# Patient Record
Sex: FEMALE | Race: OTHER | Employment: FULL TIME | URBAN - METROPOLITAN AREA
[De-identification: names, ages, dates, MRNs, and addresses within clinical notes are randomized per-mention and may not be internally consistent; named-entity substitution may affect disease eponyms.]

---

## 2017-02-01 ENCOUNTER — OFFICE VISIT (OUTPATIENT)
Dept: OBGYN CLINIC | Facility: CLINIC | Age: 27
End: 2017-02-01

## 2017-02-01 VITALS
HEART RATE: 81 BPM | BODY MASS INDEX: 24.39 KG/M2 | HEIGHT: 65 IN | SYSTOLIC BLOOD PRESSURE: 128 MMHG | WEIGHT: 146.38 LBS | DIASTOLIC BLOOD PRESSURE: 86 MMHG

## 2017-02-01 DIAGNOSIS — Z01.419 WELL WOMAN EXAM WITH ROUTINE GYNECOLOGICAL EXAM: Primary | ICD-10-CM

## 2017-02-01 DIAGNOSIS — Z30.431 IUD CHECK UP: ICD-10-CM

## 2017-02-01 DIAGNOSIS — Z12.4 SCREENING FOR MALIGNANT NEOPLASM OF CERVIX: ICD-10-CM

## 2017-02-01 DIAGNOSIS — Z11.3 SCREENING FOR STD (SEXUALLY TRANSMITTED DISEASE): ICD-10-CM

## 2017-02-01 PROCEDURE — 99385 PREV VISIT NEW AGE 18-39: CPT | Performed by: CLINICAL NURSE SPECIALIST

## 2017-02-01 RX ORDER — AZITHROMYCIN 250 MG/1
TABLET, FILM COATED ORAL
Refills: 0 | COMMUNITY
Start: 2016-11-16 | End: 2017-02-01

## 2017-02-02 LAB
C TRACH DNA SPEC QL NAA+PROBE: NEGATIVE
N GONORRHOEA DNA SPEC QL NAA+PROBE: NEGATIVE

## 2017-02-02 NOTE — PROGRESS NOTES
Tigre Duarte is a 27year old female  No LMP recorded. Patient is not currently having periods (Reason: IUD - Intrauterine Device). Patient presents with:  Gyn Exam: New Patient- Annual  New patient. Unsure of when last annual exam and pap was.  Juana Goddard pain, lumps, or discharge. Neurological:  denies headaches, extremity weakness or numbness. Psychiatric: denies depression or anxiety. Endocrine:   denies excessive thirst or urination. Heme/Lymph:  denies history of anemia, easy bruising or bleeding. with HPV Reflex Request  -     ThinPrep PAP with HPV Reflex Request; Future  -     ThinPrep PAP with HPV Reflex Request        Pap done today-reviewed new pap guidelines. Safe sexual practices and condom use urged.   STD testing done-will call with results

## 2017-02-03 LAB — T VAGINALIS RRNA SPEC QL NAA+PROBE: NEGATIVE

## 2017-03-03 ENCOUNTER — TELEPHONE (OUTPATIENT)
Dept: OBGYN CLINIC | Facility: CLINIC | Age: 27
End: 2017-03-03

## 2017-03-03 NOTE — TELEPHONE ENCOUNTER
Lmtcb. Need to find out if pt plans on completing STD labs that were ordered by [facility] on 2/1/17. Labs postponed for another month.

## 2017-11-08 PROBLEM — F33.1 MODERATE EPISODE OF RECURRENT MAJOR DEPRESSIVE DISORDER (HCC): Status: ACTIVE | Noted: 2017-11-08

## 2017-11-08 PROBLEM — F41.1 GAD (GENERALIZED ANXIETY DISORDER): Status: ACTIVE | Noted: 2017-11-08

## 2018-11-07 ENCOUNTER — OFFICE VISIT (OUTPATIENT)
Dept: OBGYN CLINIC | Facility: CLINIC | Age: 28
End: 2018-11-07
Payer: COMMERCIAL

## 2018-11-07 VITALS
SYSTOLIC BLOOD PRESSURE: 122 MMHG | WEIGHT: 152 LBS | HEART RATE: 85 BPM | DIASTOLIC BLOOD PRESSURE: 79 MMHG | BODY MASS INDEX: 25 KG/M2

## 2018-11-07 DIAGNOSIS — Z30.09 ENCOUNTER FOR COUNSELING REGARDING CONTRACEPTION: ICD-10-CM

## 2018-11-07 DIAGNOSIS — Z01.419 WOMEN'S ANNUAL ROUTINE GYNECOLOGICAL EXAMINATION: Primary | ICD-10-CM

## 2018-11-07 DIAGNOSIS — Z12.4 SCREENING FOR CERVICAL CANCER: ICD-10-CM

## 2018-11-07 PROCEDURE — 99395 PREV VISIT EST AGE 18-39: CPT | Performed by: CLINICAL NURSE SPECIALIST

## 2018-11-14 NOTE — PROGRESS NOTES
Vahe Heredia is a 25year old female  No LMP recorded. Patient is not currently having periods (Reason: IUD - Intrauterine Device). Patient presents with:  Gyn Exam: Discuss Mirena IUD removal.  LAst annual exam and pap was last year.  Requests pap Tab, Take 1 tablet (20 mg total) by mouth daily. , Disp: 90 tablet, Rfl: 0  •  cetirizine 10 MG Oral Tab, Take 10 mg by mouth as needed. , Disp: , Rfl:   •  Levonorgestrel (MIRENA, 52 MG,) 20 MCG/24HR Intrauterine IUD, 1 each by Intrauterine route once. , D normal in size, contour, position, mobility, without tenderness  Adnexa: normal without masses or tenderness  Perineum: normal  Anus: no hemorroids     Assessment & Plan:  Hines Lefort was seen today for gyn exam.    Diagnoses and all orders for this visit:    W

## 2018-11-15 ENCOUNTER — OFFICE VISIT (OUTPATIENT)
Dept: OBGYN CLINIC | Facility: CLINIC | Age: 28
End: 2018-11-15
Payer: COMMERCIAL

## 2018-11-15 VITALS — HEART RATE: 108 BPM | SYSTOLIC BLOOD PRESSURE: 120 MMHG | DIASTOLIC BLOOD PRESSURE: 81 MMHG

## 2018-11-15 DIAGNOSIS — Z30.432 ENCOUNTER FOR REMOVAL OF INTRAUTERINE CONTRACEPTIVE DEVICE: Primary | ICD-10-CM

## 2018-11-15 PROCEDURE — 58301 REMOVE INTRAUTERINE DEVICE: CPT | Performed by: CLINICAL NURSE SPECIALIST

## 2018-11-15 RX ORDER — LEVONORGESTREL AND ETHINYL ESTRADIOL 0.1-0.02MG
1 KIT ORAL DAILY
Qty: 28 TABLET | Refills: 11 | Status: SHIPPED | OUTPATIENT
Start: 2018-11-15 | End: 2018-12-13

## 2018-11-15 NOTE — PROCEDURES
IUD Removal     Pregnancy Results: n/a-IUD not  yet  Birth control method(s) used:  Mirena IUD  Consent signed. Procedure discussed with the patient in detail including indication, risks, benefits, alternatives and complications.     Pelvic Exam Fin

## 2019-05-17 ENCOUNTER — TELEPHONE (OUTPATIENT)
Dept: OBGYN CLINIC | Facility: CLINIC | Age: 29
End: 2019-05-17

## 2019-05-17 NOTE — TELEPHONE ENCOUNTER
Pt had hpt. LMP 3-20-19, exact date. Pt agrees to see all the MDs. Pt states that she will start a pnv with dha. Pt will call if any problems or spotting/bleeing. Pt made an appt for OBN appt. Pt aware to arrive 15 minutes early for upt.   Pt states sheila

## 2019-05-25 ENCOUNTER — NURSE ONLY (OUTPATIENT)
Dept: OBGYN CLINIC | Facility: CLINIC | Age: 29
End: 2019-05-25

## 2019-05-25 VITALS — WEIGHT: 159.63 LBS | HEIGHT: 66 IN | BODY MASS INDEX: 25.66 KG/M2

## 2019-05-25 DIAGNOSIS — Z34.91 ENCOUNTER FOR SUPERVISION OF NORMAL PREGNANCY IN FIRST TRIMESTER, UNSPECIFIED GRAVIDITY: Primary | ICD-10-CM

## 2019-05-25 PROCEDURE — 81025 URINE PREGNANCY TEST: CPT | Performed by: OBSTETRICS & GYNECOLOGY

## 2019-05-25 RX ORDER — CHOLECALCIFEROL (VITAMIN D3) 25 MCG
1 TABLET,CHEWABLE ORAL DAILY
COMMUNITY
End: 2020-11-05

## 2019-05-25 NOTE — PROGRESS NOTES
Pt seen for OBN appt today with no complaints. Normal PN labs, HCV and Sickle Cell ordered. Pt advised all labs must be completed and resulted prior to MD appt.  Assisted pt with scheduling NPN appt with DI[facility]OND on 6/22/19 at 8:30am.     Pt and partner travele No    Down Syndrome No    Hemophilia No    [place]'s Chorea No    Mental Retardation/Autism No    Muscular Dystrophy No    Neural tube defects No    Sickle Cell Disease or trait No    Bear-Sachs Disease No    Thalassemia No    Other inherited genetic or

## 2019-07-01 ENCOUNTER — LAB ENCOUNTER (OUTPATIENT)
Dept: LAB | Facility: HOSPITAL | Age: 29
End: 2019-07-01
Attending: OBSTETRICS & GYNECOLOGY
Payer: COMMERCIAL

## 2019-07-01 DIAGNOSIS — Z34.91 ENCOUNTER FOR SUPERVISION OF NORMAL PREGNANCY IN FIRST TRIMESTER, UNSPECIFIED GRAVIDITY: ICD-10-CM

## 2019-07-01 LAB
ANTIBODY SCREEN: NEGATIVE
BASOPHILS # BLD AUTO: 0.02 X10(3) UL (ref 0–0.2)
BASOPHILS NFR BLD AUTO: 0.3 %
DEPRECATED RDW RBC AUTO: 41.9 FL (ref 35.1–46.3)
EOSINOPHIL # BLD AUTO: 0.03 X10(3) UL (ref 0–0.7)
EOSINOPHIL NFR BLD AUTO: 0.4 %
ERYTHROCYTE [DISTWIDTH] IN BLOOD BY AUTOMATED COUNT: 12.3 % (ref 11–15)
HBV SURFACE AG SER-ACNC: <0.1 [IU]/L
HBV SURFACE AG SERPL QL IA: NONREACTIVE
HCT VFR BLD AUTO: 36.3 % (ref 35–48)
HCV AB SERPL QL IA: NONREACTIVE
HGB BLD-MCNC: 12.1 G/DL (ref 12–16)
IMM GRANULOCYTES # BLD AUTO: 0.06 X10(3) UL (ref 0–1)
IMM GRANULOCYTES NFR BLD: 0.9 %
LYMPHOCYTES # BLD AUTO: 1.64 X10(3) UL (ref 1–4)
LYMPHOCYTES NFR BLD AUTO: 23.4 %
MCH RBC QN AUTO: 30.9 PG (ref 26–34)
MCHC RBC AUTO-ENTMCNC: 33.3 G/DL (ref 31–37)
MCV RBC AUTO: 92.8 FL (ref 80–100)
MONOCYTES # BLD AUTO: 0.33 X10(3) UL (ref 0.1–1)
MONOCYTES NFR BLD AUTO: 4.7 %
NEUTROPHILS # BLD AUTO: 4.92 X10 (3) UL (ref 1.5–7.7)
NEUTROPHILS # BLD AUTO: 4.92 X10(3) UL (ref 1.5–7.7)
NEUTROPHILS NFR BLD AUTO: 70.3 %
PLATELET # BLD AUTO: 363 10(3)UL (ref 150–450)
RBC # BLD AUTO: 3.91 X10(6)UL (ref 3.8–5.3)
RH BLOOD TYPE: POSITIVE
RUBV IGG SER QL: POSITIVE
RUBV IGG SER-ACNC: 31.7 IU/ML (ref 10–?)
WBC # BLD AUTO: 7 X10(3) UL (ref 4–11)

## 2019-07-01 PROCEDURE — 85660 RBC SICKLE CELL TEST: CPT

## 2019-07-01 PROCEDURE — 87340 HEPATITIS B SURFACE AG IA: CPT

## 2019-07-01 PROCEDURE — 85025 COMPLETE CBC W/AUTO DIFF WBC: CPT

## 2019-07-01 PROCEDURE — 87086 URINE CULTURE/COLONY COUNT: CPT

## 2019-07-01 PROCEDURE — 86901 BLOOD TYPING SEROLOGIC RH(D): CPT

## 2019-07-01 PROCEDURE — 86780 TREPONEMA PALLIDUM: CPT

## 2019-07-01 PROCEDURE — 86900 BLOOD TYPING SEROLOGIC ABO: CPT

## 2019-07-01 PROCEDURE — 86803 HEPATITIS C AB TEST: CPT

## 2019-07-01 PROCEDURE — 86762 RUBELLA ANTIBODY: CPT

## 2019-07-01 PROCEDURE — 87389 HIV-1 AG W/HIV-1&-2 AB AG IA: CPT

## 2019-07-01 PROCEDURE — 86850 RBC ANTIBODY SCREEN: CPT

## 2019-07-01 PROCEDURE — 36415 COLL VENOUS BLD VENIPUNCTURE: CPT

## 2019-07-03 ENCOUNTER — INITIAL PRENATAL (OUTPATIENT)
Dept: OBGYN CLINIC | Facility: CLINIC | Age: 29
End: 2019-07-03

## 2019-07-03 VITALS
DIASTOLIC BLOOD PRESSURE: 74 MMHG | BODY MASS INDEX: 25 KG/M2 | HEART RATE: 96 BPM | SYSTOLIC BLOOD PRESSURE: 110 MMHG | WEIGHT: 157 LBS

## 2019-07-03 DIAGNOSIS — Z34.91 ENCOUNTER FOR SUPERVISION OF NORMAL PREGNANCY IN FIRST TRIMESTER, UNSPECIFIED GRAVIDITY: Primary | ICD-10-CM

## 2019-07-03 DIAGNOSIS — Z11.3 SCREEN FOR STD (SEXUALLY TRANSMITTED DISEASE): ICD-10-CM

## 2019-07-03 LAB
HGB S BLD QL SOLY: NEGATIVE
MULTISTIX LOT#: NORMAL NUMERIC
PH, URINE: 5 (ref 4.5–8)
SPECIFIC GRAVITY: 1.02 (ref 1–1.03)
T PALLIDUM AB SER QL: NEGATIVE

## 2019-07-03 PROCEDURE — 76815 OB US LIMITED FETUS(S): CPT | Performed by: OBSTETRICS & GYNECOLOGY

## 2019-07-03 PROCEDURE — 81002 URINALYSIS NONAUTO W/O SCOPE: CPT | Performed by: OBSTETRICS & GYNECOLOGY

## 2019-07-03 NOTE — PROGRESS NOTES
29 yo  at 11 weeks 0 days by sure LMP who presents for first visit. Size equal to dates on US dates. History of depression on Lexapro. Needs referral for psychiatry as her insurance changed. Will generate.   Discussed genetic testing and patient

## 2019-07-05 ENCOUNTER — PATIENT MESSAGE (OUTPATIENT)
Dept: OBGYN CLINIC | Facility: CLINIC | Age: 29
End: 2019-07-05

## 2019-07-05 LAB
C TRACH DNA SPEC QL NAA+PROBE: NEGATIVE
N GONORRHOEA DNA SPEC QL NAA+PROBE: NEGATIVE
T VAGINALIS RRNA SPEC QL NAA+PROBE: NEGATIVE

## 2019-07-25 ENCOUNTER — PATIENT MESSAGE (OUTPATIENT)
Dept: OBGYN CLINIC | Facility: CLINIC | Age: 29
End: 2019-07-25

## 2019-07-25 RX ORDER — ESCITALOPRAM OXALATE 20 MG/1
30 TABLET ORAL DAILY
Qty: 45 TABLET | Refills: 2 | Status: SHIPPED | OUTPATIENT
Start: 2019-07-25 | End: 2020-03-18

## 2019-07-25 NOTE — TELEPHONE ENCOUNTER
WILL YOU REFILL LEXAPRO? WILL HAVE TO DIRECT PT TO MANAGED CARE FOR OTHER OPTIONS RELATED TO HER INSURANCE.

## 2019-07-25 NOTE — TELEPHONE ENCOUNTER
From: Jer Ordaz  To: Rodrick Henriquez DO  Sent: 7/25/2019 12:35 PM CDT  Subject: Prescription Question    Marlon Smith cannot see me until October and I only have a week left of medication. Can you refill it until then?      I asked Britton Stephens

## 2019-07-26 NOTE — TELEPHONE ENCOUNTER
From: Jeevan Reyes     Sent: 7/25/2019 12:33 PM CDT       To:  Britton SMITH  Subject: RE: Psychiatry referral information    Alfred Dugan cannot see me until October at the earliest and I need help with my anxiety and depression immediatel

## 2019-07-26 NOTE — TELEPHONE ENCOUNTER
Please call patient and notify of Dr. Jain Late as an alternative.   Otherwise she needs to contact her insurance and see who is covered by her policy

## 2019-07-30 ENCOUNTER — ROUTINE PRENATAL (OUTPATIENT)
Dept: OBGYN CLINIC | Facility: CLINIC | Age: 29
End: 2019-07-30

## 2019-07-30 VITALS
HEART RATE: 80 BPM | DIASTOLIC BLOOD PRESSURE: 65 MMHG | BODY MASS INDEX: 26 KG/M2 | WEIGHT: 162.63 LBS | SYSTOLIC BLOOD PRESSURE: 107 MMHG

## 2019-07-30 DIAGNOSIS — Z34.82 ENCOUNTER FOR SUPERVISION OF OTHER NORMAL PREGNANCY IN SECOND TRIMESTER: Primary | ICD-10-CM

## 2019-07-30 PROBLEM — Q87.40 MARFAN SYNDROME: Status: ACTIVE | Noted: 2019-07-30

## 2019-07-30 PROBLEM — Q87.40 MARFAN SYNDROME (HCC): Status: ACTIVE | Noted: 2019-07-30

## 2019-07-30 LAB
APPEARANCE: CLEAR
MULTISTIX LOT#: NORMAL NUMERIC
PH, URINE: 6.5 (ref 4.5–8)
SPECIFIC GRAVITY: 1.01 (ref 1–1.03)
URINE-COLOR: YELLOW
UROBILINOGEN,SEMI-QN: 0.2 MG/DL (ref 0–1.9)

## 2019-07-30 PROCEDURE — 81002 URINALYSIS NONAUTO W/O SCOPE: CPT | Performed by: OBSTETRICS & GYNECOLOGY

## 2019-07-30 NOTE — PROGRESS NOTES
No complaints. Half brother with Marfans. Pt thinks it runs on his father's side of the family (not related). She is to check with her mother and consider genetics.    RTC 4 wks

## 2019-08-02 ENCOUNTER — PATIENT MESSAGE (OUTPATIENT)
Dept: OBGYN CLINIC | Facility: CLINIC | Age: 29
End: 2019-08-02

## 2019-08-02 DIAGNOSIS — Z82.79 FAMILY HISTORY OF MARFAN SYNDROME: Primary | ICD-10-CM

## 2019-08-02 NOTE — TELEPHONE ENCOUNTER
From: Chris Taylor  To: Lars Mcdonnell MD  Sent: 8/2/2019 1:15 PM CDT  Subject: Other    Good Afternoon,    Just wanted to follow up quickly on the question about Marphans. My half brother was not diagnosed.  He went through genetic testing and they [facility]

## 2019-08-02 NOTE — TELEPHONE ENCOUNTER
From: José Miguel Blair  To: Tyron Cooley DO  Sent: 8/2/2019 1:17 PM CDT  Subject: Referral Request    At our initial appointment we discussed an anatomy scan with the high risk physicians around 20 weeks.  Can you send a referral so that we can get sheila

## 2019-08-27 ENCOUNTER — ROUTINE PRENATAL (OUTPATIENT)
Dept: OBGYN CLINIC | Facility: CLINIC | Age: 29
End: 2019-08-27

## 2019-08-27 VITALS
BODY MASS INDEX: 28 KG/M2 | SYSTOLIC BLOOD PRESSURE: 104 MMHG | HEART RATE: 101 BPM | WEIGHT: 171 LBS | DIASTOLIC BLOOD PRESSURE: 71 MMHG

## 2019-08-27 DIAGNOSIS — Z34.02 ENCOUNTER FOR SUPERVISION OF NORMAL FIRST PREGNANCY IN SECOND TRIMESTER: Primary | ICD-10-CM

## 2019-08-27 PROBLEM — Z78.9 RECENT FOREIGN TRAVEL: Status: ACTIVE | Noted: 2019-08-27

## 2019-08-27 LAB
APPEARANCE: CLEAR
MULTISTIX LOT#: NORMAL NUMERIC
URINE-COLOR: YELLOW

## 2019-08-27 PROCEDURE — 81002 URINALYSIS NONAUTO W/O SCOPE: CPT | Performed by: OBSTETRICS & GYNECOLOGY

## 2019-09-09 NOTE — PROGRESS NOTES
Outpatient Maternal-Fetal Medicine Consultation    Dear Dr. Tilton Rubinstein    Thank you for requesting ultrasound evaluation and maternal fetal medicine consultation on your patient Rukhsana Domínguez.   As you are aware she is a 30year old female  with a singl interpreted the results and reviewed them with the patient.     DISCUSSION  During her visit we discussed and reviewed the following issues:  [place]  History  This patient traveled to an area with ongoing [facility] virus transmission however she den ultrasonographic screening or frequency of ultrasonography in pregnant women with Zika virus infection.   If maternal testing does not suggest infection, women should receive the same ultrasonographic screening as any other pregnant woman as part of standar exposure. · Symptomatic pregnant women with possible Zika virus exposure or women who are pregnant with a fetus showing abnormalities consistent with con- genital Zika virus syndrome should be tested as soon as possible.  Asymptomatic pregnant women with o not diagnosed with anything. It was noted that he has physical attributes of several non-harmful genetic disorders like extra long limbs, pectus excavatum and loose joints.  They did not test his parents, but based on physical examination it was felt that t

## 2019-09-10 ENCOUNTER — HOSPITAL ENCOUNTER (OUTPATIENT)
Dept: PERINATAL CARE | Facility: HOSPITAL | Age: 29
Discharge: HOME | End: 2019-09-10
Attending: OBSTETRICS & GYNECOLOGY
Payer: COMMERCIAL

## 2019-09-10 VITALS
WEIGHT: 171 LBS | SYSTOLIC BLOOD PRESSURE: 116 MMHG | BODY MASS INDEX: 28 KG/M2 | HEART RATE: 90 BPM | DIASTOLIC BLOOD PRESSURE: 69 MMHG

## 2019-09-10 DIAGNOSIS — O99.891 ZIKA VIRUS EXPOSURE AFFECTING PREGNANCY: ICD-10-CM

## 2019-09-10 DIAGNOSIS — Z20.821 ZIKA VIRUS EXPOSURE AFFECTING PREGNANCY: ICD-10-CM

## 2019-09-10 DIAGNOSIS — Q87.40 MARFAN SYNDROME: ICD-10-CM

## 2019-09-10 DIAGNOSIS — Z78.9 RECENT FOREIGN TRAVEL: ICD-10-CM

## 2019-09-10 DIAGNOSIS — Z82.79 FAMILY HISTORY OF MARFAN SYNDROME: ICD-10-CM

## 2019-09-10 DIAGNOSIS — Z36.3 ENCOUNTER FOR ANTENATAL SCREENING FOR MALFORMATION USING ULTRASOUND: ICD-10-CM

## 2019-09-10 DIAGNOSIS — Z36.3 ENCOUNTER FOR ANTENATAL SCREENING FOR MALFORMATION USING ULTRASOUND: Primary | ICD-10-CM

## 2019-09-10 PROCEDURE — 76811 OB US DETAILED SNGL FETUS: CPT | Performed by: OBSTETRICS & GYNECOLOGY

## 2019-09-10 PROCEDURE — 99243 OFF/OP CNSLTJ NEW/EST LOW 30: CPT | Performed by: OBSTETRICS & GYNECOLOGY

## 2019-09-11 ENCOUNTER — TELEPHONE (OUTPATIENT)
Dept: OBGYN CLINIC | Facility: CLINIC | Age: 29
End: 2019-09-11

## 2019-09-11 DIAGNOSIS — Z78.9 RECENT FOREIGN TRAVEL: Primary | ICD-10-CM

## 2019-09-11 NOTE — TELEPHONE ENCOUNTER
----- Message from Cas Grant sent at 9/10/2019  2:49 PM CDT -----  Regarding: [facility] REFERRAL  [facility] scheduled for 12/4/19    Thanks  Francisca Hobson

## 2019-09-25 ENCOUNTER — ROUTINE PRENATAL (OUTPATIENT)
Dept: OBGYN CLINIC | Facility: CLINIC | Age: 29
End: 2019-09-25

## 2019-09-25 VITALS
HEART RATE: 88 BPM | DIASTOLIC BLOOD PRESSURE: 65 MMHG | WEIGHT: 174.38 LBS | SYSTOLIC BLOOD PRESSURE: 98 MMHG | BODY MASS INDEX: 28 KG/M2

## 2019-09-25 DIAGNOSIS — Z34.92 ENCOUNTER FOR SUPERVISION OF NORMAL PREGNANCY IN SECOND TRIMESTER, UNSPECIFIED GRAVIDITY: Primary | ICD-10-CM

## 2019-09-25 LAB
APPEARANCE: CLEAR
MULTISTIX LOT#: NORMAL NUMERIC
SPECIFIC GRAVITY: 1.03 (ref 1–1.03)
URINE-COLOR: YELLOW

## 2019-09-25 PROCEDURE — 81002 URINALYSIS NONAUTO W/O SCOPE: CPT | Performed by: OBSTETRICS & GYNECOLOGY

## 2019-09-30 NOTE — PROGRESS NOTES
Unknown gender. Whitney Denson at visit. Discussed Level 2. She has GERD and we discussed PO H2 blocker.

## 2019-10-23 ENCOUNTER — TELEPHONE (OUTPATIENT)
Dept: OBGYN CLINIC | Facility: CLINIC | Age: 29
End: 2019-10-23

## 2019-10-23 ENCOUNTER — APPOINTMENT (OUTPATIENT)
Dept: LAB | Facility: HOSPITAL | Age: 29
End: 2019-10-23
Attending: OBSTETRICS & GYNECOLOGY
Payer: COMMERCIAL

## 2019-10-23 DIAGNOSIS — Z34.82 ENCOUNTER FOR SUPERVISION OF OTHER NORMAL PREGNANCY IN SECOND TRIMESTER: Primary | ICD-10-CM

## 2019-10-23 DIAGNOSIS — Z34.82 ENCOUNTER FOR SUPERVISION OF OTHER NORMAL PREGNANCY IN SECOND TRIMESTER: ICD-10-CM

## 2019-10-23 PROCEDURE — 85027 COMPLETE CBC AUTOMATED: CPT

## 2019-10-23 PROCEDURE — 82950 GLUCOSE TEST: CPT

## 2019-10-23 PROCEDURE — 36415 COLL VENOUS BLD VENIPUNCTURE: CPT

## 2019-10-23 NOTE — TELEPHONE ENCOUNTER
PER RAS CALLING FROM OUTPATIENT REGISTRATION / REQUESTING AN ORDER FOR LABS GLUCOSE / PT IS WAITING / PLEASE ADVISE

## 2019-10-25 ENCOUNTER — ROUTINE PRENATAL (OUTPATIENT)
Dept: OBGYN CLINIC | Facility: CLINIC | Age: 29
End: 2019-10-25

## 2019-10-25 VITALS
HEART RATE: 73 BPM | BODY MASS INDEX: 29 KG/M2 | WEIGHT: 180.19 LBS | DIASTOLIC BLOOD PRESSURE: 71 MMHG | SYSTOLIC BLOOD PRESSURE: 105 MMHG

## 2019-10-25 DIAGNOSIS — Z34.92 ENCOUNTER FOR SUPERVISION OF NORMAL PREGNANCY IN SECOND TRIMESTER, UNSPECIFIED GRAVIDITY: Primary | ICD-10-CM

## 2019-10-25 PROCEDURE — 90715 TDAP VACCINE 7 YRS/> IM: CPT | Performed by: OBSTETRICS & GYNECOLOGY

## 2019-10-25 PROCEDURE — 90686 IIV4 VACC NO PRSV 0.5 ML IM: CPT | Performed by: OBSTETRICS & GYNECOLOGY

## 2019-10-25 PROCEDURE — 90472 IMMUNIZATION ADMIN EACH ADD: CPT | Performed by: OBSTETRICS & GYNECOLOGY

## 2019-10-25 PROCEDURE — 81002 URINALYSIS NONAUTO W/O SCOPE: CPT | Performed by: OBSTETRICS & GYNECOLOGY

## 2019-10-25 PROCEDURE — 96372 THER/PROPH/DIAG INJ SC/IM: CPT | Performed by: OBSTETRICS & GYNECOLOGY

## 2019-10-25 PROCEDURE — 90471 IMMUNIZATION ADMIN: CPT | Performed by: OBSTETRICS & GYNECOLOGY

## 2019-10-25 NOTE — PROGRESS NOTES
Pt is 27w2d, had PN appt with [facility] today. Flu vaccine administered to pt's right deltoid and TDap to left deltoid. Pt tolerated well. VIS sheets provided to pt and encouraged to call back with questions or concerns.

## 2019-11-04 NOTE — TELEPHONE ENCOUNTER
Dr. Chris Casper     Please sign off on form: FMLA for maternity leave   -Highlight the patient and hit \"Chart\" button. -In Chart Review, w/in the Encounter tab - click 1 time on the Telephone call encounter for 10/23/2019. Scroll down the telephone encounter.

## 2019-11-13 ENCOUNTER — ROUTINE PRENATAL (OUTPATIENT)
Dept: OBGYN CLINIC | Facility: CLINIC | Age: 29
End: 2019-11-13

## 2019-11-13 VITALS
BODY MASS INDEX: 30 KG/M2 | WEIGHT: 187 LBS | SYSTOLIC BLOOD PRESSURE: 108 MMHG | DIASTOLIC BLOOD PRESSURE: 72 MMHG | HEART RATE: 108 BPM

## 2019-11-13 DIAGNOSIS — Z34.83 ENCOUNTER FOR SUPERVISION OF OTHER NORMAL PREGNANCY IN THIRD TRIMESTER: Primary | ICD-10-CM

## 2019-11-13 PROCEDURE — 81002 URINALYSIS NONAUTO W/O SCOPE: CPT | Performed by: OBSTETRICS & GYNECOLOGY

## 2019-11-14 NOTE — PROGRESS NOTES
Pt is on Lexapro but is requesting psychotherapy- she does not remember name of her therapist - she will go home and call back with name of MD so that referral can be generated if necessary

## 2019-11-25 RX ORDER — ESCITALOPRAM OXALATE 20 MG/1
TABLET ORAL
Qty: 135 TABLET | Refills: 0 | OUTPATIENT
Start: 2019-11-25

## 2019-11-27 ENCOUNTER — ROUTINE PRENATAL (OUTPATIENT)
Dept: OBGYN CLINIC | Facility: CLINIC | Age: 29
End: 2019-11-27

## 2019-11-27 VITALS
DIASTOLIC BLOOD PRESSURE: 68 MMHG | HEART RATE: 86 BPM | WEIGHT: 189.19 LBS | SYSTOLIC BLOOD PRESSURE: 104 MMHG | BODY MASS INDEX: 31 KG/M2

## 2019-11-27 DIAGNOSIS — Z34.83 ENCOUNTER FOR SUPERVISION OF OTHER NORMAL PREGNANCY IN THIRD TRIMESTER: Primary | ICD-10-CM

## 2019-11-27 PROCEDURE — 81002 URINALYSIS NONAUTO W/O SCOPE: CPT | Performed by: OBSTETRICS & GYNECOLOGY

## 2019-12-02 NOTE — PROGRESS NOTES
No S/S of PTL. Doing well on Lexapro and requesting Vero Vizcarraff therapy referral. She has used them prior to pregnancy.

## 2019-12-04 ENCOUNTER — HOSPITAL ENCOUNTER (OUTPATIENT)
Dept: PERINATAL CARE | Facility: HOSPITAL | Age: 29
Discharge: HOME | End: 2019-12-04
Attending: OBSTETRICS & GYNECOLOGY
Payer: COMMERCIAL

## 2019-12-04 VITALS
SYSTOLIC BLOOD PRESSURE: 106 MMHG | DIASTOLIC BLOOD PRESSURE: 65 MMHG | BODY MASS INDEX: 31 KG/M2 | HEART RATE: 102 BPM | WEIGHT: 189 LBS

## 2019-12-04 DIAGNOSIS — Z78.9 RECENT FOREIGN TRAVEL: Primary | ICD-10-CM

## 2019-12-04 DIAGNOSIS — Q87.40 MARFAN SYNDROME: ICD-10-CM

## 2019-12-04 DIAGNOSIS — Z78.9 RECENT FOREIGN TRAVEL: ICD-10-CM

## 2019-12-04 PROCEDURE — 99213 OFFICE O/P EST LOW 20 MIN: CPT | Performed by: OBSTETRICS & GYNECOLOGY

## 2019-12-04 PROCEDURE — 76816 OB US FOLLOW-UP PER FETUS: CPT | Performed by: OBSTETRICS & GYNECOLOGY

## 2019-12-11 ENCOUNTER — ROUTINE PRENATAL (OUTPATIENT)
Dept: OBGYN CLINIC | Facility: CLINIC | Age: 29
End: 2019-12-11

## 2019-12-11 VITALS
BODY MASS INDEX: 31 KG/M2 | SYSTOLIC BLOOD PRESSURE: 100 MMHG | WEIGHT: 194 LBS | DIASTOLIC BLOOD PRESSURE: 68 MMHG | HEART RATE: 90 BPM

## 2019-12-11 DIAGNOSIS — Z34.93 ENCOUNTER FOR SUPERVISION OF NORMAL PREGNANCY IN THIRD TRIMESTER, UNSPECIFIED GRAVIDITY: Primary | ICD-10-CM

## 2019-12-11 PROCEDURE — 81002 URINALYSIS NONAUTO W/O SCOPE: CPT | Performed by: OBSTETRICS & GYNECOLOGY

## 2019-12-14 ENCOUNTER — HOSPITAL ENCOUNTER (OUTPATIENT)
Facility: HOSPITAL | Age: 29
Setting detail: OBSERVATION
Discharge: HOME | End: 2019-12-14
Attending: OBSTETRICS & GYNECOLOGY | Admitting: OBSTETRICS & GYNECOLOGY
Payer: COMMERCIAL

## 2019-12-14 ENCOUNTER — TELEPHONE (OUTPATIENT)
Dept: OBGYN CLINIC | Facility: CLINIC | Age: 29
End: 2019-12-14

## 2019-12-14 VITALS
RESPIRATION RATE: 18 BRPM | HEART RATE: 97 BPM | DIASTOLIC BLOOD PRESSURE: 67 MMHG | TEMPERATURE: 98 F | SYSTOLIC BLOOD PRESSURE: 106 MMHG

## 2019-12-14 PROBLEM — Z34.90 PREGNANCY (HCC): Status: ACTIVE | Noted: 2019-12-14

## 2019-12-14 PROBLEM — Z34.90 PREGNANCY: Status: ACTIVE | Noted: 2019-12-14

## 2019-12-14 PROCEDURE — 59025 FETAL NON-STRESS TEST: CPT | Performed by: OBSTETRICS & GYNECOLOGY

## 2019-12-14 NOTE — PROGRESS NOTES
Tracing reviewed per Dr Booker Mahoney. Cervix unchanged. Discharge home to f/u at next scheduled appt.

## 2019-12-14 NOTE — TRIAGE
[facility]      Triage Note    [facility] Patient Status:  Observation    3/9/1990 MRN [id number]   Location [address] Attending Riri Miller MD   Hosp Day # 0 PCP MD Nani White: contractions rating them at 3/10. UA negative. Pt made no cervical change over one hour. Contractions spacing and patient feeling better. Increase hydration at home. Fetal heart tones tracing reassuring.      Marysol Tsang MD    Reason for visit: ayesha Veras

## 2019-12-14 NOTE — TELEPHONE ENCOUNTER
Per CAP v.moni., have pt go to [facility] for evaluation. Pt informed. [facility] informed.
Pt is 34 weeks. Experiencing cramping since yesterday. Requesting tos peak with nurse.
Pt states she's been having bad menstrual like cramps since noon yesterday. Pt states they cramping is constant. She states her abdomen is tight, but not tightening and releasing. Pt has tried timing the pain, but it never goes away.   It has not changed
none

## 2019-12-14 NOTE — PROGRESS NOTES
Pt is a 30year old female admitted to TR3/TR3-A. Patient presents with:  R/o  Labor: Abd cramping since yesterday and thinks maybe lost mucus plug. Denies LOF or bleeding. + FM per pt     Pt is  34w3d intra-uterine pregnancy.   History obta

## 2019-12-16 ENCOUNTER — HOSPITAL ENCOUNTER (OUTPATIENT)
Facility: HOSPITAL | Age: 29
Setting detail: OBSERVATION
Discharge: HOME | End: 2019-12-16
Attending: OBSTETRICS & GYNECOLOGY | Admitting: OBSTETRICS & GYNECOLOGY
Payer: COMMERCIAL

## 2019-12-16 VITALS
DIASTOLIC BLOOD PRESSURE: 68 MMHG | HEART RATE: 83 BPM | RESPIRATION RATE: 18 BRPM | TEMPERATURE: 98 F | SYSTOLIC BLOOD PRESSURE: 109 MMHG

## 2019-12-16 PROCEDURE — 59025 FETAL NON-STRESS TEST: CPT | Performed by: OBSTETRICS & GYNECOLOGY

## 2019-12-16 RX ORDER — DOCUSATE SODIUM 100 MG/1
100 CAPSULE, LIQUID FILLED ORAL 2 TIMES DAILY
COMMUNITY
End: 2020-11-05

## 2019-12-16 RX ORDER — TERBUTALINE SULFATE 1 MG/ML
0.25 INJECTION, SOLUTION SUBCUTANEOUS ONCE
Status: COMPLETED | OUTPATIENT
Start: 2019-12-16 | End: 2019-12-16

## 2019-12-16 RX ORDER — SODIUM CHLORIDE 0.9 % (FLUSH) 0.9 %
2 SYRINGE (ML) INJECTION AS NEEDED
Status: DISCONTINUED | OUTPATIENT
Start: 2019-12-16 | End: 2019-12-16

## 2019-12-16 RX ORDER — SODIUM CHLORIDE 0.9 % (FLUSH) 0.9 %
2 SYRINGE (ML) INJECTION EVERY 8 HOURS
Status: DISCONTINUED | OUTPATIENT
Start: 2019-12-16 | End: 2019-12-16

## 2019-12-16 NOTE — PROGRESS NOTES
Pt is a 30year old female admitted to TR1/TR1-A.    Pt presents with:  Patient states cramping discomfort since 12/13 for which she was monitored on 12/14; pt states she noted contractions to be regular and strong as of 2000 on 12/15; pt denies leakage of

## 2019-12-16 NOTE — TRIAGE
[facility]      Triage Note    Orozcojosselin Vizcarra Patient Status:  Observation    3/9/1990 MRN [id number]   Location [address]  Attending No att. providers found   Hosp Day # 0 PCP MD Kerwin Rhodes outer os, IV hydration and terbutaline administered, cervix 1/0/-4 after 2 hours, and no additional change after 3 hours; patient states no contractions after terbutaline administration and no contractions traced; patient discharged to home by Romeo Mccann MD wi

## 2019-12-18 ENCOUNTER — LAB ENCOUNTER (OUTPATIENT)
Dept: LAB | Facility: HOSPITAL | Age: 29
End: 2019-12-18
Attending: OBSTETRICS & GYNECOLOGY
Payer: COMMERCIAL

## 2019-12-18 DIAGNOSIS — Z34.93 ENCOUNTER FOR SUPERVISION OF NORMAL PREGNANCY IN THIRD TRIMESTER, UNSPECIFIED GRAVIDITY: ICD-10-CM

## 2019-12-18 PROCEDURE — 87389 HIV-1 AG W/HIV-1&-2 AB AG IA: CPT

## 2019-12-18 PROCEDURE — 86780 TREPONEMA PALLIDUM: CPT

## 2019-12-18 PROCEDURE — 85027 COMPLETE CBC AUTOMATED: CPT

## 2019-12-18 PROCEDURE — 36415 COLL VENOUS BLD VENIPUNCTURE: CPT

## 2019-12-23 ENCOUNTER — ROUTINE PRENATAL (OUTPATIENT)
Dept: OBGYN CLINIC | Facility: CLINIC | Age: 29
End: 2019-12-23

## 2019-12-23 VITALS
BODY MASS INDEX: 32 KG/M2 | SYSTOLIC BLOOD PRESSURE: 103 MMHG | DIASTOLIC BLOOD PRESSURE: 66 MMHG | HEART RATE: 88 BPM | WEIGHT: 196 LBS

## 2019-12-23 DIAGNOSIS — Z34.93 ENCOUNTER FOR SUPERVISION OF NORMAL PREGNANCY IN THIRD TRIMESTER, UNSPECIFIED GRAVIDITY: Primary | ICD-10-CM

## 2019-12-23 PROCEDURE — 81002 URINALYSIS NONAUTO W/O SCOPE: CPT | Performed by: OBSTETRICS & GYNECOLOGY

## 2019-12-30 ENCOUNTER — ROUTINE PRENATAL (OUTPATIENT)
Dept: OBGYN CLINIC | Facility: CLINIC | Age: 29
End: 2019-12-30

## 2019-12-30 VITALS
HEART RATE: 92 BPM | WEIGHT: 197 LBS | SYSTOLIC BLOOD PRESSURE: 107 MMHG | BODY MASS INDEX: 32 KG/M2 | DIASTOLIC BLOOD PRESSURE: 73 MMHG

## 2019-12-30 DIAGNOSIS — Z34.93 ENCOUNTER FOR SUPERVISION OF NORMAL PREGNANCY IN THIRD TRIMESTER, UNSPECIFIED GRAVIDITY: Primary | ICD-10-CM

## 2019-12-30 PROCEDURE — 81002 URINALYSIS NONAUTO W/O SCOPE: CPT | Performed by: OBSTETRICS & GYNECOLOGY

## 2020-01-06 ENCOUNTER — ROUTINE PRENATAL (OUTPATIENT)
Dept: OBGYN CLINIC | Facility: CLINIC | Age: 30
End: 2020-01-06

## 2020-01-06 VITALS
DIASTOLIC BLOOD PRESSURE: 78 MMHG | HEART RATE: 106 BPM | SYSTOLIC BLOOD PRESSURE: 114 MMHG | WEIGHT: 197.81 LBS | BODY MASS INDEX: 32 KG/M2

## 2020-01-06 DIAGNOSIS — Z34.83 ENCOUNTER FOR SUPERVISION OF OTHER NORMAL PREGNANCY IN THIRD TRIMESTER: Primary | ICD-10-CM

## 2020-01-06 LAB
MULTISTIX EXPIRATION DATE: NORMAL DATE
MULTISTIX LOT#: NORMAL NUMERIC
PH, URINE: 7 (ref 4.5–8)
SPECIFIC GRAVITY: 1.01 (ref 1–1.03)
UROBILINOGEN,SEMI-QN: 0.2 MG/DL (ref 0–1.9)

## 2020-01-06 PROCEDURE — 81002 URINALYSIS NONAUTO W/O SCOPE: CPT | Performed by: OBSTETRICS & GYNECOLOGY

## 2020-01-06 NOTE — PROGRESS NOTES
Irregular UCs since this morning. Some q30m. Labor instructions given. 3T labs and gbs reviewed. RTC 1 wk.

## 2020-01-14 ENCOUNTER — ROUTINE PRENATAL (OUTPATIENT)
Dept: OBGYN CLINIC | Facility: CLINIC | Age: 30
End: 2020-01-14

## 2020-01-14 VITALS
DIASTOLIC BLOOD PRESSURE: 79 MMHG | HEART RATE: 85 BPM | BODY MASS INDEX: 33 KG/M2 | SYSTOLIC BLOOD PRESSURE: 114 MMHG | WEIGHT: 201.38 LBS

## 2020-01-14 DIAGNOSIS — Z34.93 ENCOUNTER FOR SUPERVISION OF NORMAL PREGNANCY IN THIRD TRIMESTER, UNSPECIFIED GRAVIDITY: Primary | ICD-10-CM

## 2020-01-14 PROCEDURE — 81002 URINALYSIS NONAUTO W/O SCOPE: CPT | Performed by: OBSTETRICS & GYNECOLOGY

## 2020-01-20 ENCOUNTER — HOSPITAL ENCOUNTER (INPATIENT)
Facility: HOSPITAL | Age: 30
LOS: 2 days | Discharge: HOME | End: 2020-01-22
Attending: OBSTETRICS & GYNECOLOGY | Admitting: OBSTETRICS & GYNECOLOGY
Payer: COMMERCIAL

## 2020-01-20 ENCOUNTER — ANESTHESIA EVENT (OUTPATIENT)
Dept: OBGYN UNIT | Facility: HOSPITAL | Age: 30
End: 2020-01-20
Payer: COMMERCIAL

## 2020-01-20 ENCOUNTER — ANESTHESIA (OUTPATIENT)
Dept: OBGYN UNIT | Facility: HOSPITAL | Age: 30
End: 2020-01-20
Payer: COMMERCIAL

## 2020-01-20 PROBLEM — Z36.89 ENCOUNTER FOR TRIAGE IN PREGNANT PATIENT: Status: ACTIVE | Noted: 2020-01-20

## 2020-01-20 PROBLEM — Z36.89 ENCOUNTER FOR TRIAGE IN PREGNANT PATIENT (HCC): Status: ACTIVE | Noted: 2020-01-20

## 2020-01-20 LAB
ANTIBODY SCREEN: NEGATIVE
DEPRECATED RDW RBC AUTO: 42.7 FL (ref 35.1–46.3)
ERYTHROCYTE [DISTWIDTH] IN BLOOD BY AUTOMATED COUNT: 13.1 % (ref 11–15)
HCT VFR BLD AUTO: 33.4 % (ref 35–48)
HGB BLD-MCNC: 11 G/DL (ref 12–16)
MCH RBC QN AUTO: 29.6 PG (ref 26–34)
MCHC RBC AUTO-ENTMCNC: 32.9 G/DL (ref 31–37)
MCV RBC AUTO: 89.8 FL (ref 80–100)
PLATELET # BLD AUTO: 280 10(3)UL (ref 150–450)
RBC # BLD AUTO: 3.72 X10(6)UL (ref 3.8–5.3)
RH BLOOD TYPE: POSITIVE
WBC # BLD AUTO: 12 X10(3) UL (ref 4–11)

## 2020-01-20 PROCEDURE — 59400 OBSTETRICAL CARE: CPT | Performed by: OBSTETRICS & GYNECOLOGY

## 2020-01-20 RX ORDER — ESCITALOPRAM OXALATE 10 MG/1
30 TABLET ORAL DAILY
Status: DISCONTINUED | OUTPATIENT
Start: 2020-01-20 | End: 2020-01-22

## 2020-01-20 RX ORDER — DEXTROSE, SODIUM CHLORIDE, SODIUM LACTATE, POTASSIUM CHLORIDE, AND CALCIUM CHLORIDE 5; .6; .31; .03; .02 G/100ML; G/100ML; G/100ML; G/100ML; G/100ML
INJECTION, SOLUTION INTRAVENOUS CONTINUOUS
Status: DISCONTINUED | OUTPATIENT
Start: 2020-01-20 | End: 2020-01-20 | Stop reason: HOSPADM

## 2020-01-20 RX ORDER — LIDOCAINE HYDROCHLORIDE AND EPINEPHRINE 15; 5 MG/ML; UG/ML
INJECTION, SOLUTION EPIDURAL
Status: COMPLETED | OUTPATIENT
Start: 2020-01-20 | End: 2020-01-20

## 2020-01-20 RX ORDER — IBUPROFEN 600 MG/1
600 TABLET ORAL ONCE AS NEEDED
Status: DISCONTINUED | OUTPATIENT
Start: 2020-01-20 | End: 2020-01-20 | Stop reason: HOSPADM

## 2020-01-20 RX ORDER — NALBUPHINE HCL 10 MG/ML
2.5 AMPUL (ML) INJECTION
Status: DISCONTINUED | OUTPATIENT
Start: 2020-01-20 | End: 2020-01-20

## 2020-01-20 RX ORDER — SODIUM CHLORIDE 0.9 % (FLUSH) 0.9 %
10 SYRINGE (ML) INJECTION AS NEEDED
Status: DISCONTINUED | OUTPATIENT
Start: 2020-01-20 | End: 2020-01-22

## 2020-01-20 RX ORDER — TERBUTALINE SULFATE 1 MG/ML
0.25 INJECTION, SOLUTION SUBCUTANEOUS AS NEEDED
Status: DISCONTINUED | OUTPATIENT
Start: 2020-01-20 | End: 2020-01-20 | Stop reason: HOSPADM

## 2020-01-20 RX ORDER — METHYLERGONOVINE MALEATE 0.2 MG/ML
0.2 INJECTION INTRAVENOUS ONCE
Status: COMPLETED | OUTPATIENT
Start: 2020-01-20 | End: 2020-01-20

## 2020-01-20 RX ORDER — ONDANSETRON 2 MG/ML
4 INJECTION INTRAMUSCULAR; INTRAVENOUS ONCE AS NEEDED
Status: ACTIVE | OUTPATIENT
Start: 2020-01-20 | End: 2020-01-20

## 2020-01-20 RX ORDER — CHOLECALCIFEROL (VITAMIN D3) 25 MCG
1 TABLET,CHEWABLE ORAL DAILY
Status: DISCONTINUED | OUTPATIENT
Start: 2020-01-20 | End: 2020-01-22

## 2020-01-20 RX ORDER — SIMETHICONE 80 MG
80 TABLET,CHEWABLE ORAL 3 TIMES DAILY PRN
Status: DISCONTINUED | OUTPATIENT
Start: 2020-01-20 | End: 2020-01-22

## 2020-01-20 RX ORDER — AMMONIA INHALANTS 0.04 G/.3ML
0.3 INHALANT RESPIRATORY (INHALATION) AS NEEDED
Status: DISCONTINUED | OUTPATIENT
Start: 2020-01-20 | End: 2020-01-22

## 2020-01-20 RX ORDER — TRISODIUM CITRATE DIHYDRATE AND CITRIC ACID MONOHYDRATE 500; 334 MG/5ML; MG/5ML
30 SOLUTION ORAL AS NEEDED
Status: DISCONTINUED | OUTPATIENT
Start: 2020-01-20 | End: 2020-01-20 | Stop reason: HOSPADM

## 2020-01-20 RX ORDER — SODIUM CHLORIDE 0.9 % (FLUSH) 0.9 %
10 SYRINGE (ML) INJECTION AS NEEDED
Status: DISCONTINUED | OUTPATIENT
Start: 2020-01-20 | End: 2020-01-20 | Stop reason: HOSPADM

## 2020-01-20 RX ORDER — DIAPER,BRIEF,INFANT-TODD,DISP
1 EACH MISCELLANEOUS EVERY 6 HOURS PRN
Status: DISCONTINUED | OUTPATIENT
Start: 2020-01-20 | End: 2020-01-22

## 2020-01-20 RX ORDER — ESCITALOPRAM OXALATE 10 MG/1
20 TABLET ORAL DAILY
Status: DISCONTINUED | OUTPATIENT
Start: 2020-01-20 | End: 2020-01-20

## 2020-01-20 RX ORDER — DOCUSATE SODIUM 100 MG/1
100 CAPSULE, LIQUID FILLED ORAL DAILY PRN
Status: DISCONTINUED | OUTPATIENT
Start: 2020-01-20 | End: 2020-01-22

## 2020-01-20 RX ORDER — AMMONIA INHALANTS 0.04 G/.3ML
0.3 INHALANT RESPIRATORY (INHALATION) AS NEEDED
Status: DISCONTINUED | OUTPATIENT
Start: 2020-01-20 | End: 2020-01-20 | Stop reason: HOSPADM

## 2020-01-20 RX ORDER — LIDOCAINE HYDROCHLORIDE 10 MG/ML
INJECTION, SOLUTION INFILTRATION; PERINEURAL
Status: COMPLETED | OUTPATIENT
Start: 2020-01-20 | End: 2020-01-20

## 2020-01-20 RX ORDER — EPHEDRINE SULFATE/0.9% NACL/PF 25 MG/5 ML
5 SYRINGE (ML) INTRAVENOUS AS NEEDED
Status: DISCONTINUED | OUTPATIENT
Start: 2020-01-20 | End: 2020-01-20

## 2020-01-20 RX ORDER — LIDOCAINE HYDROCHLORIDE 10 MG/ML
30 INJECTION, SOLUTION EPIDURAL; INFILTRATION; INTRACAUDAL; PERINEURAL ONCE
Status: DISCONTINUED | OUTPATIENT
Start: 2020-01-20 | End: 2020-01-20 | Stop reason: HOSPADM

## 2020-01-20 RX ORDER — BUPIVACAINE HYDROCHLORIDE 2.5 MG/ML
10 INJECTION, SOLUTION EPIDURAL; INFILTRATION; INTRACAUDAL ONCE
Status: DISCONTINUED | OUTPATIENT
Start: 2020-01-20 | End: 2020-01-20

## 2020-01-20 RX ORDER — IBUPROFEN 600 MG/1
600 TABLET ORAL EVERY 6 HOURS PRN
Status: DISCONTINUED | OUTPATIENT
Start: 2020-01-20 | End: 2020-01-22

## 2020-01-20 RX ORDER — LIDOCAINE HYDROCHLORIDE AND EPINEPHRINE 20; 5 MG/ML; UG/ML
10 INJECTION, SOLUTION EPIDURAL; INFILTRATION; INTRACAUDAL; PERINEURAL ONCE
Status: DISCONTINUED | OUTPATIENT
Start: 2020-01-20 | End: 2020-01-20

## 2020-01-20 RX ORDER — METHYLERGONOVINE MALEATE 0.2 MG/ML
INJECTION INTRAVENOUS
Status: COMPLETED
Start: 2020-01-20 | End: 2020-01-20

## 2020-01-20 RX ADMIN — LIDOCAINE HYDROCHLORIDE 5 ML: 10 INJECTION, SOLUTION INFILTRATION; PERINEURAL at 03:35:00

## 2020-01-20 RX ADMIN — LIDOCAINE HYDROCHLORIDE AND EPINEPHRINE 5 ML: 15; 5 INJECTION, SOLUTION EPIDURAL at 03:35:00

## 2020-01-20 NOTE — LACTATION NOTE
This note was copied from a baby's chart.   LACTATION NOTE - INFANT    Evaluation Type  Evaluation Type: Inpatient    Problems & Assessment  Infant Assessment: Minimal hunger cues present  Muscle tone: Appropriate for GA    Feeding Assessment  Summary Parish Kelly

## 2020-01-20 NOTE — ANESTHESIA POSTPROCEDURE EVALUATION
Patient: Bari Khan    Procedure Summary     Date:  01/20/20 Room / Location:      Anesthesia Start:  4761 Anesthesia Stop:  8655    Procedure:  LABOR ANALGESIA Diagnosis:      Scheduled Providers:   Anesthesiologist:  MD Damon Guevara

## 2020-01-20 NOTE — PROGRESS NOTES
[facility]    Labor Progress Note    [place] Patient Status:  Inpatient    3/9/1990 MRN [id number]   Location [address] Attending [address] [place] [address], [address] [place] [address] Day # 0 PCP Melonie Lynn MD

## 2020-01-20 NOTE — PROGRESS NOTES
Both mother and baby in stable condition and transferred to #361 for continuation of care. Report given to VASILIY Granados.

## 2020-01-20 NOTE — ANESTHESIA PROCEDURE NOTES
Labor Analgesia  Date/Time: 1/20/2020 3:35 AM  Performed by: Millie Kovacs MD  Authorized by: Millie Kovacs MD       General Information and Staff    Start Time:  1/20/2020 3:19 AM  End Time:  1/20/2020 3:37 AM  Anesthesiologist:  Millie Kovacs MD  Performed

## 2020-01-20 NOTE — PROGRESS NOTES
Pt is a 30year old female admitted to TR1/TR1-A. Patient presents with:  R/o Labor: CONTRACTIONS SINCE 15  R/o Rom: LEAKING SINCE 30     Pt is  39w5d intra-uterine pregnancy.

## 2020-01-20 NOTE — ANESTHESIA PREPROCEDURE EVALUATION
Anesthesia PreOp Note    HPI:     Alexia Kline is a 30year old female who presents for preoperative consultation requested by: * No surgeons listed *    Date of Surgery: 1/20/2020    * No procedures listed *  Indication: * No pre-op diagnosis e DO  oxyTOCIN (PITOCIN) 30 units/ 500 ml 0.9% NS premix infusion, 300 mL/hr, Intravenous, Continuous, Armen Browne A,   Terbutaline Sulfate (BRETHINE) 1 MG/ML injection 0.25 mg, 0.25 mg, Subcutaneous, PRN, Armen Browne A,   Sod Citrate-Citric Acid ( tobacco: Never Used    Substance and Sexual Activity      Alcohol use: Not Currently        Alcohol/week: 0.0 standard drinks        Comment: OCC      Drug use: No      Sexual activity: Yes        Partners: Male        Comment: same partner    Lifestyle Abdominal  - normal exam    Abdomen: soft.   Bowel sounds: normal.               Anesthesia Plan:   ASA:  2  Plan:   Epidural  Informed Consent Plan and Risks Discussed With:  Patient and spouse  Discussed plan with:  Attending and surgeon      I have infor

## 2020-01-20 NOTE — LACTATION NOTE
This note was copied from a baby's chart. LACTATION NOTE - INFANT    Evaluation Type  Evaluation Type: Inpatient    Problems & Assessment  Problems Diagnosed or Identified: Shallow latch  Problems: comment/detail: On and off latching.   Infant Assessment:

## 2020-01-20 NOTE — DISCHARGE SUMMARY
[facility]    Discharge Summary    Era Ennis Patient Status:  Inpatient    3/9/1990 MRN [id number]   Location [facility] [address]SE Attending [id number] [place] [address], [address] [place] [address] Day # 0       Admit date:  2020    Discharge d

## 2020-01-20 NOTE — H&P
Chikis 26 Patient Status:  Inpatient    3/9/1990 MRN [id number]   Location [address]  Attending Adriana Beltran, [address] [facility] Day # 0 PCP Samm Montoya MD     Richard Allergies  Medications:  docusate sodium 100 MG Oral Cap, Take 100 mg by mouth 2 (two) times daily. , Disp: , Rfl: , 1/19/2020 at Unknown time  escitalopram 20 MG Oral Tab, Take 1.5 tablets (30 mg total) by mouth daily. , Disp: 45 tablet, Rfl: 2, 1/19/2020 a

## 2020-01-20 NOTE — PROGRESS NOTES
[facility]    Vaginal Delivery Note    Reyna Forman Patient Status:  Inpatient    3/9/1990 MRN [id number]   Location [facility] [address]SE Attending [address] [place] [address], [address] [place] [address] Day # 0 PCP Raúl Razo MD     Delivery     Inf

## 2020-01-20 NOTE — PLAN OF CARE
Mom and baby received into postpartum room in stable condition with all belongings and family members. Escorted to bed in low locked position with side rails up and call light in reach.  Admission assessment completed, vitals stable, bleeding normal. Report

## 2020-01-20 NOTE — LACTATION NOTE
LACTATION NOTE - MOTHER      Evaluation Type: Inpatient    Problems identified  Problems identified: Knowledge deficit;Milk supply WNL    Maternal history  Maternal history: Anxiety;Depression    Breastfeeding goal  Breastfeeding goal: To maintain breast m

## 2020-01-21 LAB
BASOPHILS # BLD AUTO: 0.03 X10(3) UL (ref 0–0.2)
BASOPHILS NFR BLD AUTO: 0.3 %
DEPRECATED RDW RBC AUTO: 43.6 FL (ref 35.1–46.3)
EOSINOPHIL # BLD AUTO: 0.03 X10(3) UL (ref 0–0.7)
EOSINOPHIL NFR BLD AUTO: 0.3 %
ERYTHROCYTE [DISTWIDTH] IN BLOOD BY AUTOMATED COUNT: 13.2 % (ref 11–15)
HCT VFR BLD AUTO: 30.2 % (ref 35–48)
HGB BLD-MCNC: 9.6 G/DL (ref 12–16)
IMM GRANULOCYTES # BLD AUTO: 0.23 X10(3) UL (ref 0–1)
IMM GRANULOCYTES NFR BLD: 2.1 %
LYMPHOCYTES # BLD AUTO: 2 X10(3) UL (ref 1–4)
LYMPHOCYTES NFR BLD AUTO: 18.1 %
MCH RBC QN AUTO: 28.5 PG (ref 26–34)
MCHC RBC AUTO-ENTMCNC: 31.8 G/DL (ref 31–37)
MCV RBC AUTO: 89.6 FL (ref 80–100)
MONOCYTES # BLD AUTO: 0.78 X10(3) UL (ref 0.1–1)
MONOCYTES NFR BLD AUTO: 7 %
NEUTROPHILS # BLD AUTO: 8.01 X10 (3) UL (ref 1.5–7.7)
NEUTROPHILS # BLD AUTO: 8.01 X10(3) UL (ref 1.5–7.7)
NEUTROPHILS NFR BLD AUTO: 72.2 %
PLATELET # BLD AUTO: 214 10(3)UL (ref 150–450)
RBC # BLD AUTO: 3.37 X10(6)UL (ref 3.8–5.3)
WBC # BLD AUTO: 11.1 X10(3) UL (ref 4–11)

## 2020-01-21 NOTE — LACTATION NOTE
LACTATION NOTE - MOTHER      Evaluation Type: Inpatient    Problems identified  Problems identified: Knowledge deficit    Maternal history  Maternal history: Anxiety;Depression    Breastfeeding goal  Breastfeeding goal: To maintain breast milk feeding per

## 2020-01-21 NOTE — PLAN OF CARE
Problem: POSTPARTUM  Goal: Long Term Goal:Experiences normal postpartum course  Description  INTERVENTIONS:  - Assess and monitor vital signs and lab values. - Assess fundus and lochia. - Provide ice/sitz baths for perineum discomfort.   - Monitor heali Assess and monitor for signs of nipple pain/trauma. - Instruct and provide assistance with proper latch. - Review techniques for milk expression (breast pumping) and storage of breast milk.  Provide pumping equipment/supplies, instructions and assistance,

## 2020-01-21 NOTE — CM/SW NOTE
MDO for EPDS-8. SW intern met with pt and FOB at bedside. Baby was in open crib under lights. Verified with pt address and phone as listed on Facesheet. This is the couples first baby. Baby boy Gene Mendez.  Pt plans on breastfeeding, but it

## 2020-01-21 NOTE — LACTATION NOTE
This note was copied from a baby's chart.   LACTATION NOTE - INFANT    Evaluation Type  Evaluation Type: Inpatient    Problems & Assessment  Problems Diagnosed or Identified: Jaundice  Problems: comment/detail: requires phototherapy, linus positive  Infant

## 2020-01-21 NOTE — PROGRESS NOTES
[facility]    Post  Progress Note      Alicia Zazueta Patient Status:  Inpatient    3/9/1990 MRN [id number]   Location [facility] 3SE Attending [address] Day # 1 PCP Jimbo Castro MD       Subjective

## 2020-01-22 VITALS
DIASTOLIC BLOOD PRESSURE: 69 MMHG | OXYGEN SATURATION: 100 % | SYSTOLIC BLOOD PRESSURE: 108 MMHG | HEART RATE: 94 BPM | TEMPERATURE: 98 F | RESPIRATION RATE: 18 BRPM

## 2020-01-22 RX ORDER — IBUPROFEN 600 MG/1
600 TABLET ORAL EVERY 6 HOURS PRN
Qty: 60 TABLET | Refills: 0 | Status: SHIPPED | OUTPATIENT
Start: 2020-01-22 | End: 2021-06-23

## 2020-01-22 NOTE — PLAN OF CARE
Problem: POSTPARTUM  Goal: Long Term Goal:Experiences normal postpartum course  Description  INTERVENTIONS:  - Assess and monitor vital signs and lab values. - Assess fundus and lochia. - Provide ice/sitz baths for perineum discomfort.   - Monitor heali nursing footstool/pillows, and breast pumps). - Encourage mother/other family members to express feelings/concerns, and actively listen. - Educate father/SO about benefits of breast feeding and how to manage common lactation challenges.   - Recommend avoi

## 2020-01-22 NOTE — PROGRESS NOTES
Post-Partum Note   1/22/2020, 5:57 AM    Subjective:  Patient doing well. Pain is well controlled. She is ambulating without lightheadedness or dizziness. Denies fevers or chills. Denies SOB, CP.      Objective:   01/21/20  0000 01/21/20  0900 01/21/20  160

## 2020-01-22 NOTE — PLAN OF CARE
Problem: POSTPARTUM  Goal: Optimize infant feeding at the breast  Description  INTERVENTIONS:  - Initiate breast feeding within first hour after birth. - Monitor effectiveness of current breast feeding efforts.   - Assess support systems available to mo engorgement. - Instruct on breast care. - Provide comfort measures.   Outcome: Completed

## 2020-01-22 NOTE — LACTATION NOTE
This note was copied from a baby's chart.   LACTATION NOTE - INFANT    Evaluation Type  Evaluation Type: Inpatient    Problems & Assessment  Problems Diagnosed or Identified: Jaundice  Problems: comment/detail: photottherapy stopped, lab draw 3pm pending di

## 2020-02-03 RX ORDER — IBUPROFEN 600 MG/1
TABLET ORAL
Qty: 60 TABLET | Refills: 0 | OUTPATIENT
Start: 2020-02-03

## 2020-03-02 ENCOUNTER — POSTPARTUM (OUTPATIENT)
Dept: OBGYN CLINIC | Facility: CLINIC | Age: 30
End: 2020-03-02

## 2020-03-02 ENCOUNTER — TELEPHONE (OUTPATIENT)
Dept: OBGYN CLINIC | Facility: CLINIC | Age: 30
End: 2020-03-02

## 2020-03-02 VITALS
SYSTOLIC BLOOD PRESSURE: 114 MMHG | DIASTOLIC BLOOD PRESSURE: 76 MMHG | WEIGHT: 187 LBS | HEART RATE: 83 BPM | BODY MASS INDEX: 30 KG/M2

## 2020-03-02 DIAGNOSIS — Z30.09 ENCOUNTER FOR GENERAL COUNSELING AND ADVICE ON CONTRACEPTIVE MANAGEMENT: ICD-10-CM

## 2020-03-02 DIAGNOSIS — Z97.5 IUD CONTRACEPTION: Primary | ICD-10-CM

## 2020-03-02 PROBLEM — Z78.9 RECENT FOREIGN TRAVEL: Status: RESOLVED | Noted: 2019-08-27 | Resolved: 2020-03-02

## 2020-03-02 PROBLEM — Z34.90 PREGNANCY (HCC): Status: RESOLVED | Noted: 2019-12-14 | Resolved: 2020-03-02

## 2020-03-02 PROBLEM — Q87.40 MARFAN SYNDROME: Status: RESOLVED | Noted: 2019-07-30 | Resolved: 2020-03-02

## 2020-03-02 PROBLEM — Q87.40 MARFAN SYNDROME (HCC): Status: RESOLVED | Noted: 2019-07-30 | Resolved: 2020-03-02

## 2020-03-02 PROBLEM — Z34.90 PREGNANCY: Status: RESOLVED | Noted: 2019-12-14 | Resolved: 2020-03-02

## 2020-03-02 NOTE — TELEPHONE ENCOUNTER
LEFT MESSAGE THAT REFERRAL HAS BEEN INITIATED AND SENT TO [facility] FOR APPROVAL. ONCE APPROVED WILL GET A NOTICE THROUGH MY CHART.

## 2020-03-02 NOTE — TELEPHONE ENCOUNTER
Seen for PP visit and wants ParaGard. Will check with insurer today and e-mail if she wants this. OK to add at 1 PM of [place] schedule tomorrow.

## 2020-03-02 NOTE — PROGRESS NOTES
JACKELINE    Mehnaz Mc is a 30year old female  here for 6 week post-partum visit. Patient delivered a  male infant on 20( Darmuid ). Patient desires IUD - Paragard. for contraception.  She had used Mirena in past with some mood and sk ASSESSMENT/PLAN  Discussed all options of birthcontrol including ocps, minipill, Mirena or Paragard IUD, nuvaring, orthoevra patch, nexplanon, Depoprovera, condoms or tubal sterilization options. Patient has chosen IUD - Paragard.  She'll call insurer

## 2020-03-25 NOTE — TELEPHONE ENCOUNTER
From: Richy Reed  To: Bing Zavala. [id number] [place] [address],   Sent: 3/25/2020 3:50 PM CDT  Subject: Referral Request    Can you send a referral for pyshcotherapy or counseling at [facility]?

## 2020-03-25 NOTE — TELEPHONE ENCOUNTER
Pt states she talked to [facility] about [address] and would like a referral sent. She has anxiety and sees a psychiatrist already. Pt denies SI/HI. Referral sent. Pt will call us Monday if she has not heard from them.

## 2020-09-16 ENCOUNTER — PATIENT MESSAGE (OUTPATIENT)
Dept: FAMILY MEDICINE CLINIC | Facility: CLINIC | Age: 30
End: 2020-09-16

## 2020-09-16 ENCOUNTER — PATIENT MESSAGE (OUTPATIENT)
Dept: OBGYN CLINIC | Facility: CLINIC | Age: 30
End: 2020-09-16

## 2020-09-16 ENCOUNTER — TELEPHONE (OUTPATIENT)
Dept: FAMILY MEDICINE CLINIC | Facility: CLINIC | Age: 30
End: 2020-09-16

## 2020-09-17 NOTE — TELEPHONE ENCOUNTER
Pt wanting message sent to [facility]. Pt states last lmp 8-16 and it was normal.  Pt did an upt and it was neg. Pt stopped breastfeeding about two months ago. No temp today. Wait three months and if no period call us?

## 2020-09-17 NOTE — TELEPHONE ENCOUNTER
From: Brendon Elias  To: TYSON De Anda  Sent: 9/16/2020 9:45 PM CDT  Subject: Non-Urgent Medical Question    Alejandro Valenzuela, I'm in my 2nd cycle post partum and my period was supposed to start Sunday/Monday and it hasn't. I not pregnant, I checked.  My las

## 2020-09-17 NOTE — TELEPHONE ENCOUNTER
From: Silvia Cunningham  To: Morena Wise MD  Sent: 9/16/2020 9:40 PM CDT  Subject: Non-Urgent Medical Question    Hi, I was stung by a bee 10 days ago and the bite has not healed and still itches.  Around day 7 it was almost fully healed but then it developed

## 2020-09-17 NOTE — TELEPHONE ENCOUNTER
From: Telma Sunshine  To: Melonie Lynn MD  Sent: 9/16/2020  9:40 PM CDT  Subject: Non-Urgent Medical Question    Hi, I was stung by a bee 10 days ago and the bite has not healed and still itches.  Around day 7 it was almost fully healed but then it developed

## 2020-09-17 NOTE — TELEPHONE ENCOUNTER
Verified pt name and . Pt states she scheduled appt through [employer] for 2020 as advised. Pt states she took Zyrtec she uses for seasonal allergies. Pt has not applied any ice to the area. States itching is not that bad.      Pt states she was suppos

## 2020-11-05 ENCOUNTER — OFFICE VISIT (OUTPATIENT)
Dept: OBGYN CLINIC | Facility: CLINIC | Age: 30
End: 2020-11-05

## 2020-11-05 VITALS
BODY MASS INDEX: 28.53 KG/M2 | HEART RATE: 82 BPM | SYSTOLIC BLOOD PRESSURE: 118 MMHG | DIASTOLIC BLOOD PRESSURE: 86 MMHG | WEIGHT: 179.63 LBS | HEIGHT: 66.5 IN

## 2020-11-05 DIAGNOSIS — Z01.419 WELL WOMAN EXAM WITH ROUTINE GYNECOLOGICAL EXAM: Primary | ICD-10-CM

## 2020-11-05 DIAGNOSIS — Z86.59 HISTORY OF DEPRESSION: ICD-10-CM

## 2020-11-05 PROCEDURE — 3074F SYST BP LT 130 MM HG: CPT | Performed by: CLINICAL NURSE SPECIALIST

## 2020-11-05 PROCEDURE — 3079F DIAST BP 80-89 MM HG: CPT | Performed by: CLINICAL NURSE SPECIALIST

## 2020-11-05 PROCEDURE — 99395 PREV VISIT EST AGE 18-39: CPT | Performed by: CLINICAL NURSE SPECIALIST

## 2020-11-05 PROCEDURE — 3008F BODY MASS INDEX DOCD: CPT | Performed by: CLINICAL NURSE SPECIALIST

## 2020-11-06 NOTE — PROGRESS NOTES
Sebas Amos is a 26year old female [id number] Patient's last menstrual period was 10/26/2020. Patient presents with:  Gyn Exam: Annual  Last seen for PP exam a few months ago. Had  20 (baby boy).  Breast fed for 6 months and has had 2 normal periods Non-medical: Not on file    Tobacco Use      Smoking status: Former Smoker        Quit date: 2019        Years since quittin.6      Smokeless tobacco: Never Used    Substance and Sexual Activity      Alcohol use: Not Currently        Alcohol/week: discharge, vaginal itching  Musculoskeletal:  denies back pain. Skin/Breast:  Denies any breast pain, lumps, or discharge. Neurological:  denies headaches, extremity weakness or numbness. Psychiatric: denies depression or anxiety.   Endocrine:   denies referral placed

## 2021-06-23 ENCOUNTER — OFFICE VISIT (OUTPATIENT)
Dept: FAMILY MEDICINE CLINIC | Facility: CLINIC | Age: 31
End: 2021-06-23

## 2021-06-23 VITALS
WEIGHT: 165.81 LBS | HEIGHT: 65 IN | HEART RATE: 86 BPM | SYSTOLIC BLOOD PRESSURE: 103 MMHG | BODY MASS INDEX: 27.63 KG/M2 | DIASTOLIC BLOOD PRESSURE: 69 MMHG

## 2021-06-23 DIAGNOSIS — H66.90 EAR INFECTION: Primary | ICD-10-CM

## 2021-06-23 PROCEDURE — 99213 OFFICE O/P EST LOW 20 MIN: CPT | Performed by: FAMILY MEDICINE

## 2021-06-23 PROCEDURE — 3008F BODY MASS INDEX DOCD: CPT | Performed by: FAMILY MEDICINE

## 2021-06-23 PROCEDURE — 3074F SYST BP LT 130 MM HG: CPT | Performed by: FAMILY MEDICINE

## 2021-06-23 PROCEDURE — 3078F DIAST BP <80 MM HG: CPT | Performed by: FAMILY MEDICINE

## 2021-06-23 RX ORDER — AMOXICILLIN AND CLAVULANATE POTASSIUM 875; 125 MG/1; MG/1
1 TABLET, FILM COATED ORAL 2 TIMES DAILY
Qty: 20 TABLET | Refills: 0 | Status: SHIPPED | OUTPATIENT
Start: 2021-06-23 | End: 2021-07-03

## 2021-06-23 RX ORDER — FLUCONAZOLE 150 MG/1
150 TABLET ORAL ONCE
Qty: 1 TABLET | Refills: 0 | Status: SHIPPED | OUTPATIENT
Start: 2021-06-23 | End: 2021-06-23

## 2021-06-23 RX ORDER — CETIRIZINE HYDROCHLORIDE 10 MG/1
10 TABLET ORAL
COMMUNITY
End: 2021-12-03

## 2021-06-23 NOTE — PROGRESS NOTES
HPI/Subjective:   Patient ID: Miguel Wakefield is a 33year old female. Patient with the pain in the left ear canal which is getting worse. No fever. As far as back pain its better . Was at chiropractor and it helped.    No abdominal pain either      Histo Assessment & Plan:   Ear problem - appears as localized infection of the ear canal with possible folliculitis  Call if not better with augmentin   Given diflucan in case gets yeast infection     No orders of the defined types were placed in this en

## 2021-10-12 ENCOUNTER — TELEPHONE (OUTPATIENT)
Dept: OBGYN CLINIC | Facility: CLINIC | Age: 31
End: 2021-10-12

## 2021-10-12 NOTE — TELEPHONE ENCOUNTER
pt. states that she is pregnant, LMP: 09/11/2021. Pt. took 3 over the counter pregnancy test, which were all positive. Pt. Would like to start prenatal care.

## 2021-10-12 NOTE — TELEPHONE ENCOUNTER
Patient calling with +HPT. LMP was 9/11/21. Cycles are regular, 28 days. Today she is dated at 4w3d. Patient agrees to policy to rotate care between all providers in the office, and understands that on-call provider will deliver her.    Patient directed

## 2021-10-30 ENCOUNTER — NURSE ONLY (OUTPATIENT)
Dept: OBGYN CLINIC | Facility: CLINIC | Age: 31
End: 2021-10-30

## 2021-10-30 VITALS — WEIGHT: 169 LBS | BODY MASS INDEX: 28 KG/M2

## 2021-10-30 DIAGNOSIS — Z34.81 ENCOUNTER FOR SUPERVISION OF OTHER NORMAL PREGNANCY IN FIRST TRIMESTER: Primary | ICD-10-CM

## 2021-10-30 PROCEDURE — 81025 URINE PREGNANCY TEST: CPT | Performed by: OBSTETRICS & GYNECOLOGY

## 2021-10-30 RX ORDER — CHOLECALCIFEROL (VITAMIN D3) 25 MCG
1 TABLET,CHEWABLE ORAL DAILY
COMMUNITY

## 2021-10-30 NOTE — PROGRESS NOTES
Pt seen for OBN appt today accompanied by partner and son. Previously delivered with us. States no complaints. Normal PN labs ordered and sickle cell screen. Pt advised all labs must be completed and resulted prior to MD appt.  Pt scheduled NPN appt with MD anyone in either family with:  Patient's age 28 years or older as of estimated date of delivery: No   Thalassemia ([facility], [facility], [facility], or  background):  MCV less than 80: No   Neural tube defect (Meningomyelocele, Spina bifida, or Anencephal

## 2021-11-09 ENCOUNTER — LAB ENCOUNTER (OUTPATIENT)
Dept: LAB | Facility: HOSPITAL | Age: 31
End: 2021-11-09
Attending: OBSTETRICS & GYNECOLOGY
Payer: COMMERCIAL

## 2021-11-09 DIAGNOSIS — Z34.81 ENCOUNTER FOR SUPERVISION OF OTHER NORMAL PREGNANCY IN FIRST TRIMESTER: ICD-10-CM

## 2021-11-09 PROCEDURE — 87389 HIV-1 AG W/HIV-1&-2 AB AG IA: CPT

## 2021-11-09 PROCEDURE — 86900 BLOOD TYPING SEROLOGIC ABO: CPT

## 2021-11-09 PROCEDURE — 86780 TREPONEMA PALLIDUM: CPT

## 2021-11-09 PROCEDURE — 36415 COLL VENOUS BLD VENIPUNCTURE: CPT

## 2021-11-09 PROCEDURE — 87086 URINE CULTURE/COLONY COUNT: CPT

## 2021-11-09 PROCEDURE — 86901 BLOOD TYPING SEROLOGIC RH(D): CPT

## 2021-11-09 PROCEDURE — 85025 COMPLETE CBC W/AUTO DIFF WBC: CPT

## 2021-11-09 PROCEDURE — 85660 RBC SICKLE CELL TEST: CPT

## 2021-11-09 PROCEDURE — 86762 RUBELLA ANTIBODY: CPT

## 2021-11-09 PROCEDURE — 87340 HEPATITIS B SURFACE AG IA: CPT

## 2021-11-09 PROCEDURE — 86850 RBC ANTIBODY SCREEN: CPT

## 2021-11-17 ENCOUNTER — INITIAL PRENATAL (OUTPATIENT)
Dept: OBGYN CLINIC | Facility: CLINIC | Age: 31
End: 2021-11-17

## 2021-11-17 VITALS
WEIGHT: 164.38 LBS | BODY MASS INDEX: 27 KG/M2 | DIASTOLIC BLOOD PRESSURE: 73 MMHG | HEART RATE: 83 BPM | SYSTOLIC BLOOD PRESSURE: 110 MMHG

## 2021-11-17 DIAGNOSIS — Z11.3 SCREENING EXAMINATION FOR VENEREAL DISEASE: ICD-10-CM

## 2021-11-17 DIAGNOSIS — Z34.91 ENCOUNTER FOR SUPERVISION OF NORMAL PREGNANCY IN FIRST TRIMESTER, UNSPECIFIED GRAVIDITY: Primary | ICD-10-CM

## 2021-11-17 DIAGNOSIS — Z12.4 SCREENING FOR MALIGNANT NEOPLASM OF CERVIX: ICD-10-CM

## 2021-11-17 PROCEDURE — 3078F DIAST BP <80 MM HG: CPT | Performed by: OBSTETRICS & GYNECOLOGY

## 2021-11-17 PROCEDURE — 81002 URINALYSIS NONAUTO W/O SCOPE: CPT | Performed by: OBSTETRICS & GYNECOLOGY

## 2021-11-17 PROCEDURE — 3074F SYST BP LT 130 MM HG: CPT | Performed by: OBSTETRICS & GYNECOLOGY

## 2021-11-18 NOTE — PROGRESS NOTES
[place] [facility] at visit. They decline genetics. \" Fauzia Thomas \" is well. Daily nausea and started Reglan after failing Unisom. I discussed Zofran as logical next step if failing Reglan.

## 2021-11-23 PROBLEM — N76.0 BACTERIAL VAGINAL INFECTION: Status: ACTIVE | Noted: 2021-11-23

## 2021-11-23 PROBLEM — B96.89 BACTERIAL VAGINAL INFECTION: Status: ACTIVE | Noted: 2021-11-23

## 2021-12-03 ENCOUNTER — OFFICE VISIT (OUTPATIENT)
Dept: FAMILY MEDICINE CLINIC | Facility: CLINIC | Age: 31
End: 2021-12-03

## 2021-12-03 VITALS
WEIGHT: 160 LBS | SYSTOLIC BLOOD PRESSURE: 113 MMHG | HEART RATE: 80 BPM | BODY MASS INDEX: 26.66 KG/M2 | HEIGHT: 65 IN | DIASTOLIC BLOOD PRESSURE: 75 MMHG

## 2021-12-03 DIAGNOSIS — J01.80 OTHER ACUTE SINUSITIS, RECURRENCE NOT SPECIFIED: Primary | ICD-10-CM

## 2021-12-03 PROCEDURE — 3074F SYST BP LT 130 MM HG: CPT | Performed by: FAMILY MEDICINE

## 2021-12-03 PROCEDURE — 3078F DIAST BP <80 MM HG: CPT | Performed by: FAMILY MEDICINE

## 2021-12-03 PROCEDURE — 3008F BODY MASS INDEX DOCD: CPT | Performed by: FAMILY MEDICINE

## 2021-12-03 PROCEDURE — 99213 OFFICE O/P EST LOW 20 MIN: CPT | Performed by: FAMILY MEDICINE

## 2021-12-03 RX ORDER — AMOXICILLIN 875 MG/1
875 TABLET, COATED ORAL 2 TIMES DAILY
Qty: 20 TABLET | Refills: 0 | Status: SHIPPED | OUTPATIENT
Start: 2021-12-03

## 2021-12-03 NOTE — PROGRESS NOTES
Subjective:   Patient ID: Gela Liz is a 33year old female. Patient here for nasal congestion, postnasal drip. Symptoms for more then a week.    No fever no cough no shortness of breath         History/Other:   Review of Systems     Constitutional: N

## 2021-12-12 ENCOUNTER — PATIENT MESSAGE (OUTPATIENT)
Dept: OBGYN CLINIC | Facility: CLINIC | Age: 31
End: 2021-12-12

## 2021-12-13 NOTE — TELEPHONE ENCOUNTER
From: Tresa Sharpe  To: Flakita Burdick. [address] [place] [address],   Sent: 12/12/2021 3:36 PM CST  Subject: Zofran    HI Dr. [address] [place] [address]. I am still very sick, throwing up almost daily and the nausea is so bad I can't really function.  Is it possible to get a prescription for Zofran

## 2021-12-13 NOTE — TELEPHONE ENCOUNTER
Pt is 13w2d with c/o n/v. Pt was prescribed Reglan but states she is still vomiting almost daily. Message to CAP (on call) if OK to try Zofran?

## 2021-12-14 ENCOUNTER — ROUTINE PRENATAL (OUTPATIENT)
Dept: OBGYN CLINIC | Facility: CLINIC | Age: 31
End: 2021-12-14

## 2021-12-14 VITALS
DIASTOLIC BLOOD PRESSURE: 67 MMHG | HEART RATE: 90 BPM | WEIGHT: 159 LBS | SYSTOLIC BLOOD PRESSURE: 96 MMHG | BODY MASS INDEX: 26 KG/M2

## 2021-12-14 DIAGNOSIS — Z34.81 ENCOUNTER FOR SUPERVISION OF OTHER NORMAL PREGNANCY IN FIRST TRIMESTER: Primary | ICD-10-CM

## 2021-12-14 DIAGNOSIS — O26.892 VAGINAL DISCHARGE DURING PREGNANCY IN SECOND TRIMESTER: ICD-10-CM

## 2021-12-14 DIAGNOSIS — N89.8 VAGINAL DISCHARGE DURING PREGNANCY IN SECOND TRIMESTER: ICD-10-CM

## 2021-12-14 PROCEDURE — 81002 URINALYSIS NONAUTO W/O SCOPE: CPT | Performed by: OBSTETRICS & GYNECOLOGY

## 2021-12-14 PROCEDURE — 3078F DIAST BP <80 MM HG: CPT | Performed by: OBSTETRICS & GYNECOLOGY

## 2021-12-14 PROCEDURE — 3074F SYST BP LT 130 MM HG: CPT | Performed by: OBSTETRICS & GYNECOLOGY

## 2021-12-14 RX ORDER — ONDANSETRON 4 MG/1
4 TABLET, FILM COATED ORAL EVERY 8 HOURS PRN
Qty: 30 TABLET | Refills: 2 | Status: SHIPPED | OUTPATIENT
Start: 2021-12-14

## 2021-12-14 NOTE — PROGRESS NOTES
No complaints except N/V not relieved with Reglan. It is actually worse since last visit. Five lb loss. We'll start Zofran and Rx sent. Vaginal culture today to rule BV in or out. Level 1 ordered for 20 weeks.

## 2021-12-28 ENCOUNTER — NURSE ONLY (OUTPATIENT)
Dept: LAB | Facility: HOSPITAL | Age: 31
End: 2021-12-28
Attending: OBSTETRICS & GYNECOLOGY
Payer: COMMERCIAL

## 2021-12-28 DIAGNOSIS — Z20.822 ENCOUNTER FOR SCREENING LABORATORY TESTING FOR COVID-19 VIRUS: ICD-10-CM

## 2021-12-29 LAB — SARS-COV-2 RNA RESP QL NAA+PROBE: NOT DETECTED

## 2022-01-02 ENCOUNTER — PATIENT MESSAGE (OUTPATIENT)
Dept: OBGYN CLINIC | Facility: CLINIC | Age: 32
End: 2022-01-02

## 2022-01-03 NOTE — TELEPHONE ENCOUNTER
From: Thalia Wray  To: Latanya Sorenson. [id number] [facility], DO  Sent: 1/2/2022 9:40 AM CST  Subject: Bleeding    Hi . [id number] [facility],     This morning after a bowel movement I noticed some blood.  I checked and it was vaginal. It was a small amount so I am wearing a pad to see if

## 2022-01-07 NOTE — TELEPHONE ENCOUNTER
16w6d. Pt state bleeding since straining for BM at noon, pt states she noted some cramping with forcing for BM. Pt states the cramping has subside, has been resting in bed.  Pt went to check bleeding while on the phone with RN, indicates smear of light pink

## 2022-01-08 ENCOUNTER — ROUTINE PRENATAL (OUTPATIENT)
Dept: OBGYN CLINIC | Facility: CLINIC | Age: 32
End: 2022-01-08
Payer: COMMERCIAL

## 2022-01-08 VITALS
SYSTOLIC BLOOD PRESSURE: 113 MMHG | HEART RATE: 96 BPM | BODY MASS INDEX: 27 KG/M2 | WEIGHT: 161.31 LBS | DIASTOLIC BLOOD PRESSURE: 75 MMHG

## 2022-01-08 DIAGNOSIS — Z34.92 ENCOUNTER FOR SUPERVISION OF NORMAL PREGNANCY IN SECOND TRIMESTER, UNSPECIFIED GRAVIDITY: ICD-10-CM

## 2022-01-08 DIAGNOSIS — K62.5 ANAL BLEEDING: ICD-10-CM

## 2022-01-08 DIAGNOSIS — O46.92 VAGINAL BLEEDING IN PREGNANCY, SECOND TRIMESTER: Primary | ICD-10-CM

## 2022-01-08 LAB
BILIRUBIN: NEGATIVE
GLUCOSE (URINE DIPSTICK): NEGATIVE MG/DL
KETONES (URINE DIPSTICK): NEGATIVE MG/DL
MULTISTIX LOT#: ABNORMAL NUMERIC
NITRITE, URINE: NEGATIVE
OCCULT BLOOD: NEGATIVE
PH, URINE: 7.5 (ref 4.5–8)
SPECIFIC GRAVITY: 1.01 (ref 1–1.03)
UROBILINOGEN,SEMI-QN: 0.2 MG/DL (ref 0–1.9)

## 2022-01-08 PROCEDURE — 81002 URINALYSIS NONAUTO W/O SCOPE: CPT | Performed by: OBSTETRICS & GYNECOLOGY

## 2022-01-08 PROCEDURE — 3078F DIAST BP <80 MM HG: CPT | Performed by: OBSTETRICS & GYNECOLOGY

## 2022-01-08 PROCEDURE — 3074F SYST BP LT 130 MM HG: CPT | Performed by: OBSTETRICS & GYNECOLOGY

## 2022-01-08 PROCEDURE — 99213 OFFICE O/P EST LOW 20 MIN: CPT | Performed by: OBSTETRICS & GYNECOLOGY

## 2022-01-08 NOTE — PROGRESS NOTES
Problem visit: Called yesterday with recurrent anal and separate vaginal bleeding. First episode two weeks ago after straining with BM as well as IC.  Recurred yesterday after BM and she states with certainty that there was anal and checked separately for v

## 2022-01-12 ENCOUNTER — PATIENT MESSAGE (OUTPATIENT)
Dept: OBGYN CLINIC | Facility: CLINIC | Age: 32
End: 2022-01-12

## 2022-01-12 DIAGNOSIS — O46.92 VAGINAL BLEEDING IN PREGNANCY, SECOND TRIMESTER: Primary | ICD-10-CM

## 2022-01-12 NOTE — TELEPHONE ENCOUNTER
Message to referral pool to please order MFM consult and US per DIAMOND visit notes on 1/8/22.  Thank you

## 2022-01-12 NOTE — TELEPHONE ENCOUNTER
From: Ny Bhakta  To: Brigitte Ro.  [address] [place] [address] Meg,   Sent: 1/12/2022 2:46 PM CST  Subject: MFM appointment    Hi, at my last appointment with  [address] [place] [address] Meg he said I should be getting an appointment referral for Maternal Fetal Medicine to do my 20 week scan due to

## 2022-01-13 ENCOUNTER — PATIENT MESSAGE (OUTPATIENT)
Dept: OBGYN CLINIC | Facility: CLINIC | Age: 32
End: 2022-01-13

## 2022-01-13 DIAGNOSIS — Z20.822 ENCOUNTER FOR LABORATORY TESTING FOR COVID-19 VIRUS: Primary | ICD-10-CM

## 2022-01-13 NOTE — TELEPHONE ENCOUNTER
From: Aydee Roper  To: Chrissy Garza MD  Sent: 1/12/2022 8:51 PM CST  Subject: Covid exposure     HI Dr. Sarah Dee,     I was just informed my son tested positive on Monday.  I and vaccinated and boosted with no symptoms so my quarantine per the CDC should end on F

## 2022-01-13 NOTE — TELEPHONE ENCOUNTER
Patient is 17w5d with covid exposure and no symptoms. See patient message and advise on reschedule, video or in-person on 1/15.      To RADHA

## 2022-01-13 NOTE — TELEPHONE ENCOUNTER
From: Katarina Pena  Sent: 1/13/2022 5:10 PM CST  To: [facility] Ob/Gyne Clinical Staff  Subject: Covid exposure     I just got a fever of 100.7 so I'll need to reschedule.

## 2022-01-14 ENCOUNTER — NURSE ONLY (OUTPATIENT)
Dept: LAB | Facility: HOSPITAL | Age: 32
End: 2022-01-14
Attending: OBSTETRICS & GYNECOLOGY
Payer: COMMERCIAL

## 2022-01-14 DIAGNOSIS — Z20.822 ENCOUNTER FOR LABORATORY TESTING FOR COVID-19 VIRUS: ICD-10-CM

## 2022-01-14 NOTE — TELEPHONE ENCOUNTER
Patient cancelled appt. States she was just seen 1/8/22 for spotting with DIAMOND and is comfortable waiting until Feb PN to be seen. She has US scheduled 2/1/22 and [facility] 2/8/22, so prefers not to schedule additional visit.      Await covid test.

## 2022-01-14 NOTE — TELEPHONE ENCOUNTER
17w5d pt is going for covid testing. Pt has an appt on 1/15/22 with you. Do not know the results and do not know if results will be available before 1/15. Do you want to do a video call?

## 2022-01-17 LAB — SARS-COV-2 RNA RESP QL NAA+PROBE: DETECTED

## 2022-01-17 NOTE — TELEPHONE ENCOUNTER
From: Bronson Zhou  To: Miguel Browne DO  Sent: 1/16/2022 10:45 AM CST  Subject: Therapy & Psychiatry     Hi Dr. Yoselin Cooney,     Can you please send a referral for [facility] counseling and psychiatric support?  I've been struggling mentally for awhile bu

## 2022-01-20 PROBLEM — U07.1 COVID-19 AFFECTING PREGNANCY, ANTEPARTUM (HCC): Status: ACTIVE | Noted: 2022-01-20

## 2022-01-20 PROBLEM — O98.519 COVID-19 AFFECTING PREGNANCY, ANTEPARTUM: Status: ACTIVE | Noted: 2022-01-20

## 2022-01-20 PROBLEM — U07.1 COVID-19 AFFECTING PREGNANCY, ANTEPARTUM: Status: ACTIVE | Noted: 2022-01-20

## 2022-01-20 PROBLEM — O98.519 COVID-19 AFFECTING PREGNANCY, ANTEPARTUM (HCC): Status: ACTIVE | Noted: 2022-01-20

## 2022-01-20 NOTE — TELEPHONE ENCOUNTER
18w5d, Pt called and informed of COVID results, discussed quarantine for 10 days from on-set of s/s which pt indicates is 1/12/2022. Pt states she is feeling better, slight congestion and fatigue at this time.  Pt states fever was only for a few hours and a

## 2022-02-08 ENCOUNTER — HOSPITAL ENCOUNTER (OUTPATIENT)
Dept: PERINATAL CARE | Facility: HOSPITAL | Age: 32
Discharge: HOME | End: 2022-02-08
Attending: OBSTETRICS & GYNECOLOGY
Payer: COMMERCIAL

## 2022-02-08 VITALS
DIASTOLIC BLOOD PRESSURE: 72 MMHG | BODY MASS INDEX: 28 KG/M2 | SYSTOLIC BLOOD PRESSURE: 110 MMHG | HEART RATE: 93 BPM | WEIGHT: 166 LBS

## 2022-02-08 DIAGNOSIS — O46.92 VAGINAL BLEEDING IN PREGNANCY, SECOND TRIMESTER: ICD-10-CM

## 2022-02-08 DIAGNOSIS — O98.519 COVID-19 AFFECTING PREGNANCY, ANTEPARTUM: Primary | ICD-10-CM

## 2022-02-08 DIAGNOSIS — Z03.75 SUSPECTED SHORTENING OF CERVIX NOT FOUND: ICD-10-CM

## 2022-02-08 DIAGNOSIS — O98.519 COVID-19 AFFECTING PREGNANCY, ANTEPARTUM: ICD-10-CM

## 2022-02-08 DIAGNOSIS — U07.1 COVID-19 AFFECTING PREGNANCY, ANTEPARTUM: ICD-10-CM

## 2022-02-08 DIAGNOSIS — U07.1 COVID-19 AFFECTING PREGNANCY, ANTEPARTUM: Primary | ICD-10-CM

## 2022-02-08 PROCEDURE — 76811 OB US DETAILED SNGL FETUS: CPT | Performed by: OBSTETRICS & GYNECOLOGY

## 2022-02-08 PROCEDURE — 76817 TRANSVAGINAL US OBSTETRIC: CPT | Performed by: OBSTETRICS & GYNECOLOGY

## 2022-02-08 PROCEDURE — 99243 OFF/OP CNSLTJ NEW/EST LOW 30: CPT | Performed by: OBSTETRICS & GYNECOLOGY

## 2022-02-21 ENCOUNTER — PATIENT MESSAGE (OUTPATIENT)
Dept: OBGYN CLINIC | Facility: CLINIC | Age: 32
End: 2022-02-21

## 2022-02-21 NOTE — TELEPHONE ENCOUNTER
From: Abbie Garcia  To: Chantel Sifuentes. [address] [place] [address],   Sent: 2/21/2022 3:47 PM CST  Subject: KATHARINE 32 Week Referral    Hi  [address] [place] [address] - [facility] said they wanted me back at 32 weeks. Can you send through a referral so I can get that scheduled as soon as possible to make sure they don't book up?     Thanks

## 2022-02-22 ENCOUNTER — ROUTINE PRENATAL (OUTPATIENT)
Dept: OBGYN CLINIC | Facility: CLINIC | Age: 32
End: 2022-02-22
Payer: COMMERCIAL

## 2022-02-22 VITALS
WEIGHT: 170.38 LBS | HEART RATE: 89 BPM | BODY MASS INDEX: 28 KG/M2 | DIASTOLIC BLOOD PRESSURE: 70 MMHG | SYSTOLIC BLOOD PRESSURE: 109 MMHG

## 2022-02-22 DIAGNOSIS — Z34.91 ENCOUNTER FOR SUPERVISION OF NORMAL PREGNANCY IN FIRST TRIMESTER, UNSPECIFIED GRAVIDITY: Primary | ICD-10-CM

## 2022-02-22 LAB
APPEARANCE: CLEAR
BILIRUBIN: NEGATIVE
GLUCOSE (URINE DIPSTICK): NEGATIVE MG/DL
KETONES (URINE DIPSTICK): NEGATIVE MG/DL
MULTISTIX LOT#: 1027 NUMERIC
NITRITE, URINE: NEGATIVE
OCCULT BLOOD: NEGATIVE
PH, URINE: 7 (ref 4.5–8)
PROTEIN (URINE DIPSTICK): NEGATIVE MG/DL
SPECIFIC GRAVITY: 1.02 (ref 1–1.03)
URINE-COLOR: YELLOW
UROBILINOGEN,SEMI-QN: 0.2 MG/DL (ref 0–1.9)

## 2022-02-22 PROCEDURE — 3074F SYST BP LT 130 MM HG: CPT | Performed by: OBSTETRICS & GYNECOLOGY

## 2022-02-22 PROCEDURE — 3078F DIAST BP <80 MM HG: CPT | Performed by: OBSTETRICS & GYNECOLOGY

## 2022-02-22 PROCEDURE — 81002 URINALYSIS NONAUTO W/O SCOPE: CPT | Performed by: OBSTETRICS & GYNECOLOGY

## 2022-03-15 ENCOUNTER — LAB ENCOUNTER (OUTPATIENT)
Dept: LAB | Facility: HOSPITAL | Age: 32
End: 2022-03-15
Attending: OBSTETRICS & GYNECOLOGY
Payer: COMMERCIAL

## 2022-03-15 DIAGNOSIS — Z34.91 ENCOUNTER FOR SUPERVISION OF NORMAL PREGNANCY IN FIRST TRIMESTER, UNSPECIFIED GRAVIDITY: ICD-10-CM

## 2022-03-15 LAB
BASOPHILS # BLD AUTO: 0.04 X10(3) UL (ref 0–0.2)
BASOPHILS NFR BLD AUTO: 0.4 %
DEPRECATED RDW RBC AUTO: 47 FL (ref 35.1–46.3)
EOSINOPHIL # BLD AUTO: 0.06 X10(3) UL (ref 0–0.7)
EOSINOPHIL NFR BLD AUTO: 0.7 %
ERYTHROCYTE [DISTWIDTH] IN BLOOD BY AUTOMATED COUNT: 13.2 % (ref 11–15)
GLUCOSE 1H P GLC SERPL-MCNC: 110 MG/DL
HCT VFR BLD AUTO: 34 %
HGB BLD-MCNC: 10.9 G/DL
IMM GRANULOCYTES # BLD AUTO: 0.15 X10(3) UL (ref 0–1)
IMM GRANULOCYTES NFR BLD: 1.7 %
LYMPHOCYTES # BLD AUTO: 2.03 X10(3) UL (ref 1–4)
LYMPHOCYTES NFR BLD AUTO: 22.7 %
MCH RBC QN AUTO: 30.9 PG (ref 26–34)
MCHC RBC AUTO-ENTMCNC: 32.1 G/DL (ref 31–37)
MCV RBC AUTO: 96.3 FL
MONOCYTES # BLD AUTO: 0.56 X10(3) UL (ref 0.1–1)
MONOCYTES NFR BLD AUTO: 6.3 %
NEUTROPHILS # BLD AUTO: 6.1 X10 (3) UL (ref 1.5–7.7)
NEUTROPHILS # BLD AUTO: 6.1 X10(3) UL (ref 1.5–7.7)
NEUTROPHILS NFR BLD AUTO: 68.2 %
PLATELET # BLD AUTO: 346 10(3)UL (ref 150–450)
RBC # BLD AUTO: 3.53 X10(6)UL
WBC # BLD AUTO: 8.9 X10(3) UL (ref 4–11)

## 2022-03-15 PROCEDURE — 36415 COLL VENOUS BLD VENIPUNCTURE: CPT

## 2022-03-15 PROCEDURE — 85025 COMPLETE CBC W/AUTO DIFF WBC: CPT

## 2022-03-15 PROCEDURE — 82950 GLUCOSE TEST: CPT

## 2022-03-16 ENCOUNTER — PATIENT MESSAGE (OUTPATIENT)
Dept: OBGYN CLINIC | Facility: CLINIC | Age: 32
End: 2022-03-16

## 2022-03-16 RX ORDER — BREAST PUMP
EACH MISCELLANEOUS
Qty: 1 EACH | Refills: 0 | Status: SHIPPED | OUTPATIENT
Start: 2022-03-16 | End: 2022-03-16

## 2022-03-16 NOTE — TELEPHONE ENCOUNTER
Patient with [facility]- requesting referral for breast pump. Will need to fax to [facility] when approved.      To referrals dept  to assist with referral.

## 2022-03-16 NOTE — TELEPHONE ENCOUNTER
From: Juanito Guzman  To: Latanya Sorenson. Kim Beasley DO  Sent: 3/16/2022 1:09 PM CDT  Subject: Breast Pump Prescription/Referral    Hi Dr. Kim Beasley,     My insurance requires a prescription for a breast pump so I can purchase through a medical supply company, is that something you'd provide?

## 2022-03-17 NOTE — TELEPHONE ENCOUNTER
Referral initiated, sent to [facility] [employer] for approval. Manage care number provided if she has any questions on referral. Advised pt we cannot specify she would have to contact beto directly

## 2022-03-21 ENCOUNTER — ROUTINE PRENATAL (OUTPATIENT)
Dept: OBGYN CLINIC | Facility: CLINIC | Age: 32
End: 2022-03-21
Payer: COMMERCIAL

## 2022-03-21 VITALS
HEART RATE: 93 BPM | DIASTOLIC BLOOD PRESSURE: 76 MMHG | WEIGHT: 177.19 LBS | BODY MASS INDEX: 29 KG/M2 | SYSTOLIC BLOOD PRESSURE: 114 MMHG

## 2022-03-21 DIAGNOSIS — Z34.82 ENCOUNTER FOR SUPERVISION OF OTHER NORMAL PREGNANCY IN SECOND TRIMESTER: Primary | ICD-10-CM

## 2022-03-21 LAB
BILIRUBIN: NEGATIVE
GLUCOSE (URINE DIPSTICK): NEGATIVE MG/DL
KETONES (URINE DIPSTICK): NEGATIVE MG/DL
MULTISTIX EXPIRATION DATE: ABNORMAL DATE
MULTISTIX LOT#: 1027 NUMERIC
NITRITE, URINE: NEGATIVE
OCCULT BLOOD: NEGATIVE
PH, URINE: 6.5 (ref 4.5–8)
PROTEIN (URINE DIPSTICK): NEGATIVE MG/DL
SPECIFIC GRAVITY: 1.02 (ref 1–1.03)
UROBILINOGEN,SEMI-QN: 0.2 MG/DL (ref 0–1.9)

## 2022-03-21 PROCEDURE — 3074F SYST BP LT 130 MM HG: CPT | Performed by: OBSTETRICS & GYNECOLOGY

## 2022-03-21 PROCEDURE — 81002 URINALYSIS NONAUTO W/O SCOPE: CPT | Performed by: OBSTETRICS & GYNECOLOGY

## 2022-03-21 PROCEDURE — 3078F DIAST BP <80 MM HG: CPT | Performed by: OBSTETRICS & GYNECOLOGY

## 2022-03-24 ENCOUNTER — TELEPHONE (OUTPATIENT)
Dept: ADMINISTRATIVE | Age: 32
End: 2022-03-24

## 2022-03-29 ENCOUNTER — PATIENT MESSAGE (OUTPATIENT)
Dept: OBGYN CLINIC | Facility: CLINIC | Age: 32
End: 2022-03-29

## 2022-03-29 NOTE — TELEPHONE ENCOUNTER
28w3d. See communications below. Pt is vaccinated and boosted. Pt's 3year old had a virus. Pt and  seem to have virus now. Pt is keeping fluid down. Pt had temp of 99.8. Pt has chills, muscle aches and diarrhea x 1. Denies a cough, SOB, headache, loss of taste/smell, sore throat, runny nose and vomiting. Pt felt like she wanted to vomit yesterday. Informed pt to hydrate and eat small meals. Informed pt to go and have covid testing done at lab. Pt wants to only do covid test at home. Sent to CAP for recs if pt can only do covid testing at home, instead of covid testing at the lab. Sent to CAP.

## 2022-03-29 NOTE — TELEPHONE ENCOUNTER
From: Latasha Grover  Sent: 3/29/2022 2:45 PM CDT  To: [facility] Ob/Gyne Clinical Staff  Subject: Stomach Virus    My symptoms started around 6pm yesterday. Nausea, extreme acid reflux, indigestion and burping that kept me up most of the night, vomit feeling but it neber came up and several bouts of diarrhea this morning, muscle soreness, exhaustion, and the temp around 99.8 all day today. I think my system is basically empty so I haven't had any more since then.

## 2022-03-30 NOTE — TELEPHONE ENCOUNTER
At signing of covid lab order received message that pt has tested positive for covid in the last 90 days. Pt was positive on 1/14/22. Message to CAP on call to ask if pt should still do Covid test or can pt do home test?  Rapid is not available to order thru [address] [place] [address].

## 2022-03-30 NOTE — TELEPHONE ENCOUNTER
Message to Kinjal Paez [phone] on call to please review message below and advise. Pt states symptoms resolved today.

## 2022-03-30 NOTE — TELEPHONE ENCOUNTER
Because she is pregnant, she needs to come in for a PCR test at the lab.   Home test is not adequate

## 2022-03-30 NOTE — TELEPHONE ENCOUNTER
Agree that if she had covid within 90 days, she does not need to repeat test.  Sounds like she had gastroenteritis since her symptoms have resolved

## 2022-04-07 ENCOUNTER — ROUTINE PRENATAL (OUTPATIENT)
Dept: OBGYN CLINIC | Facility: CLINIC | Age: 32
End: 2022-04-07
Payer: COMMERCIAL

## 2022-04-07 VITALS
WEIGHT: 175 LBS | DIASTOLIC BLOOD PRESSURE: 71 MMHG | BODY MASS INDEX: 29 KG/M2 | HEART RATE: 87 BPM | SYSTOLIC BLOOD PRESSURE: 110 MMHG

## 2022-04-07 DIAGNOSIS — Z34.83 ENCOUNTER FOR SUPERVISION OF OTHER NORMAL PREGNANCY IN THIRD TRIMESTER: Primary | ICD-10-CM

## 2022-04-07 LAB
APPEARANCE: CLEAR
GLUCOSE (URINE DIPSTICK): NEGATIVE MG/DL
MULTISTIX LOT#: NORMAL NUMERIC
NITRITE, URINE: NEGATIVE
URINE-COLOR: YELLOW

## 2022-04-07 PROCEDURE — 81002 URINALYSIS NONAUTO W/O SCOPE: CPT | Performed by: OBSTETRICS & GYNECOLOGY

## 2022-04-07 PROCEDURE — 3078F DIAST BP <80 MM HG: CPT | Performed by: OBSTETRICS & GYNECOLOGY

## 2022-04-07 PROCEDURE — 3074F SYST BP LT 130 MM HG: CPT | Performed by: OBSTETRICS & GYNECOLOGY

## 2022-04-07 NOTE — PROGRESS NOTES
Boy. No S/S of PTL. Constipated and now using PNV once daily. [facility] 04-25.

## 2022-04-07 NOTE — TELEPHONE ENCOUNTER
Dr. Carey Bruno,     Please sign off on form: Fmla  -Highlight the patient and hit \"Chart\" button. -In Chart Review, w/in the Encounter tab - click 1 time on the Telephone call encounter for 3/24/22 Scroll down the telephone encounter.  -Click \"scan on\" blue Hyperlink under \"Media\" heading for Fmla Dr Carey Bruno 4/7/22 w/in the telephone enc.  -Click on Acknowledge button at the bottom right corner and left-click onto image, signature stamp appears and drag signature to Provider signature line. Stamp will turn blue. Close window. Thank you,    vAery Santoyo.

## 2022-04-19 ENCOUNTER — TELEPHONE (OUTPATIENT)
Dept: OBGYN CLINIC | Facility: CLINIC | Age: 32
End: 2022-04-19

## 2022-04-19 NOTE — TELEPHONE ENCOUNTER
Patient declines appt. She states this is second appt we have cancelled when she was already there, and she is very frustrated. She states there is no medical basis for being seen every 2 weeks for a 5 minute appt. I advised that our office bases practices on ACOG recommendations. At 31 weeks should be seen every 2 weeks to assess for any changes. She states if we cared that much we would not turn her away. Asks what single moms are supposed to do? States if these recommendations were based on health, then child would be allowed, since he had a negative Covid test 15 prior to appt. Apologized to patient, but did remind that it is office policy that no one under 18 years may attend appts. She states she will keep [facility] appt for growth on 4/25/22, and will schedule with us for the following week. Patti Lomas.

## 2022-04-19 NOTE — TELEPHONE ENCOUNTER
Pt came in for pn appt with EMILY. Pt had toddler with her, she said she does not have . I told pt the policy that no one under the age of 25 can accompany you for the visit. Also no child can be left unattended in waiting room. Appt was cancelled. RL6 is filed.

## 2022-04-25 ENCOUNTER — HOSPITAL ENCOUNTER (OUTPATIENT)
Dept: PERINATAL CARE | Facility: HOSPITAL | Age: 32
End: 2022-04-25
Attending: OBSTETRICS & GYNECOLOGY
Payer: COMMERCIAL

## 2022-04-25 ENCOUNTER — HOSPITAL ENCOUNTER (OUTPATIENT)
Dept: PERINATAL CARE | Facility: HOSPITAL | Age: 32
Discharge: HOME | End: 2022-04-25
Attending: OBSTETRICS & GYNECOLOGY
Payer: COMMERCIAL

## 2022-04-25 VITALS
SYSTOLIC BLOOD PRESSURE: 103 MMHG | DIASTOLIC BLOOD PRESSURE: 70 MMHG | WEIGHT: 180 LBS | BODY MASS INDEX: 30 KG/M2 | HEART RATE: 86 BPM

## 2022-04-25 DIAGNOSIS — F33.1 MODERATE EPISODE OF RECURRENT MAJOR DEPRESSIVE DISORDER (HCC): ICD-10-CM

## 2022-04-25 DIAGNOSIS — O46.92 VAGINAL BLEEDING IN PREGNANCY, SECOND TRIMESTER: ICD-10-CM

## 2022-04-25 DIAGNOSIS — O98.513 COVID-19 AFFECTING PREGNANCY IN THIRD TRIMESTER: Primary | ICD-10-CM

## 2022-04-25 DIAGNOSIS — U07.1 COVID-19 AFFECTING PREGNANCY IN THIRD TRIMESTER: Primary | ICD-10-CM

## 2022-04-25 DIAGNOSIS — U07.1 COVID-19 AFFECTING PREGNANCY, ANTEPARTUM: ICD-10-CM

## 2022-04-25 DIAGNOSIS — O98.519 COVID-19 AFFECTING PREGNANCY, ANTEPARTUM: ICD-10-CM

## 2022-04-25 PROCEDURE — 76819 FETAL BIOPHYS PROFIL W/O NST: CPT

## 2022-04-25 PROCEDURE — 76816 OB US FOLLOW-UP PER FETUS: CPT | Performed by: OBSTETRICS & GYNECOLOGY

## 2022-05-04 ENCOUNTER — ROUTINE PRENATAL (OUTPATIENT)
Dept: OBGYN CLINIC | Facility: CLINIC | Age: 32
End: 2022-05-04
Payer: COMMERCIAL

## 2022-05-04 VITALS
WEIGHT: 182.81 LBS | HEART RATE: 84 BPM | BODY MASS INDEX: 30 KG/M2 | DIASTOLIC BLOOD PRESSURE: 79 MMHG | SYSTOLIC BLOOD PRESSURE: 113 MMHG

## 2022-05-04 DIAGNOSIS — Z34.83 ENCOUNTER FOR SUPERVISION OF OTHER NORMAL PREGNANCY IN THIRD TRIMESTER: Primary | ICD-10-CM

## 2022-05-04 LAB
BILIRUBIN: NEGATIVE
GLUCOSE (URINE DIPSTICK): NEGATIVE MG/DL
KETONES (URINE DIPSTICK): NEGATIVE MG/DL
MULTISTIX EXPIRATION DATE: ABNORMAL DATE
MULTISTIX LOT#: 1027 NUMERIC
NITRITE, URINE: NEGATIVE
OCCULT BLOOD: NEGATIVE
PH, URINE: 7.5 (ref 4.5–8)
PROTEIN (URINE DIPSTICK): NEGATIVE MG/DL
SPECIFIC GRAVITY: 1.02 (ref 1–1.03)
UROBILINOGEN,SEMI-QN: 1 MG/DL (ref 0–1.9)

## 2022-05-04 PROCEDURE — 90715 TDAP VACCINE 7 YRS/> IM: CPT | Performed by: OBSTETRICS & GYNECOLOGY

## 2022-05-04 PROCEDURE — 3074F SYST BP LT 130 MM HG: CPT | Performed by: OBSTETRICS & GYNECOLOGY

## 2022-05-04 PROCEDURE — 81002 URINALYSIS NONAUTO W/O SCOPE: CPT | Performed by: OBSTETRICS & GYNECOLOGY

## 2022-05-04 PROCEDURE — 3078F DIAST BP <80 MM HG: CPT | Performed by: OBSTETRICS & GYNECOLOGY

## 2022-05-04 PROCEDURE — 90471 IMMUNIZATION ADMIN: CPT | Performed by: OBSTETRICS & GYNECOLOGY

## 2022-05-16 ENCOUNTER — LAB ENCOUNTER (OUTPATIENT)
Dept: LAB | Age: 32
End: 2022-05-16
Attending: OBSTETRICS & GYNECOLOGY
Payer: COMMERCIAL

## 2022-05-16 ENCOUNTER — ROUTINE PRENATAL (OUTPATIENT)
Dept: OBGYN CLINIC | Facility: CLINIC | Age: 32
End: 2022-05-16
Payer: COMMERCIAL

## 2022-05-16 VITALS
BODY MASS INDEX: 30 KG/M2 | DIASTOLIC BLOOD PRESSURE: 76 MMHG | SYSTOLIC BLOOD PRESSURE: 127 MMHG | HEART RATE: 103 BPM | WEIGHT: 182.38 LBS

## 2022-05-16 DIAGNOSIS — Z34.83 ENCOUNTER FOR SUPERVISION OF OTHER NORMAL PREGNANCY IN THIRD TRIMESTER: Primary | ICD-10-CM

## 2022-05-16 DIAGNOSIS — Z34.83 ENCOUNTER FOR SUPERVISION OF OTHER NORMAL PREGNANCY IN THIRD TRIMESTER: ICD-10-CM

## 2022-05-16 LAB
BILIRUBIN: NEGATIVE
DEPRECATED RDW RBC AUTO: 42.5 FL (ref 35.1–46.3)
ERYTHROCYTE [DISTWIDTH] IN BLOOD BY AUTOMATED COUNT: 12.8 % (ref 11–15)
GLUCOSE (URINE DIPSTICK): NEGATIVE MG/DL
HCT VFR BLD AUTO: 32.5 %
HGB BLD-MCNC: 10.4 G/DL
KETONES (URINE DIPSTICK): NEGATIVE MG/DL
MCH RBC QN AUTO: 29.5 PG (ref 26–34)
MCHC RBC AUTO-ENTMCNC: 32 G/DL (ref 31–37)
MCV RBC AUTO: 92.3 FL
MULTISTIX EXPIRATION DATE: ABNORMAL DATE
MULTISTIX LOT#: 1027 NUMERIC
NITRITE, URINE: NEGATIVE
OCCULT BLOOD: NEGATIVE
PH, URINE: 6.5 (ref 4.5–8)
PLATELET # BLD AUTO: 321 10(3)UL (ref 150–450)
PROTEIN (URINE DIPSTICK): NEGATIVE MG/DL
RBC # BLD AUTO: 3.52 X10(6)UL
SPECIFIC GRAVITY: 1.02 (ref 1–1.03)
UROBILINOGEN,SEMI-QN: 0.2 MG/DL (ref 0–1.9)
WBC # BLD AUTO: 8.1 X10(3) UL (ref 4–11)

## 2022-05-16 PROCEDURE — 3074F SYST BP LT 130 MM HG: CPT | Performed by: OBSTETRICS & GYNECOLOGY

## 2022-05-16 PROCEDURE — 36415 COLL VENOUS BLD VENIPUNCTURE: CPT

## 2022-05-16 PROCEDURE — 3078F DIAST BP <80 MM HG: CPT | Performed by: OBSTETRICS & GYNECOLOGY

## 2022-05-16 PROCEDURE — 81002 URINALYSIS NONAUTO W/O SCOPE: CPT | Performed by: OBSTETRICS & GYNECOLOGY

## 2022-05-16 PROCEDURE — 85027 COMPLETE CBC AUTOMATED: CPT

## 2022-05-16 PROCEDURE — 86780 TREPONEMA PALLIDUM: CPT

## 2022-05-16 PROCEDURE — 87389 HIV-1 AG W/HIV-1&-2 AB AG IA: CPT

## 2022-05-18 LAB — T PALLIDUM AB SER QL: NEGATIVE

## 2022-05-20 ENCOUNTER — TELEPHONE (OUTPATIENT)
Dept: OBGYN CLINIC | Facility: CLINIC | Age: 32
End: 2022-05-20

## 2022-05-20 NOTE — TELEPHONE ENCOUNTER
----- Message from Lily Tello MD sent at 5/20/2022  1:59 PM CDT -----  Anemic in pregnancy- needs slow fe 1 tab po qd

## 2022-05-21 NOTE — TELEPHONE ENCOUNTER
Pt advised of below recs per CAP. Pt advised no need for rx as it is an OTC medication. Advised to increase water intake to prevent constipation. Pt states \"oh great I am already constipation what other news\" Advised increasing green leafy foods will also help with constipation. Pt states \"I have a 3 yo what if I don't take my PN until late evening\" Pt inform can take iron in the morning if that is her case. Pt seemed bother while I was speaking with her.  Pt states \"you guys call like it is an emergency just send the msg via [employer]\" Msg sent

## 2022-05-26 ENCOUNTER — ROUTINE PRENATAL (OUTPATIENT)
Dept: OBGYN CLINIC | Facility: CLINIC | Age: 32
End: 2022-05-26
Payer: COMMERCIAL

## 2022-05-26 VITALS
BODY MASS INDEX: 30 KG/M2 | HEART RATE: 99 BPM | WEIGHT: 181.81 LBS | DIASTOLIC BLOOD PRESSURE: 75 MMHG | SYSTOLIC BLOOD PRESSURE: 114 MMHG

## 2022-05-26 DIAGNOSIS — Z34.83 ENCOUNTER FOR SUPERVISION OF OTHER NORMAL PREGNANCY IN THIRD TRIMESTER: Primary | ICD-10-CM

## 2022-05-26 PROBLEM — B96.89 BACTERIAL VAGINAL INFECTION: Status: RESOLVED | Noted: 2021-11-23 | Resolved: 2022-05-26

## 2022-05-26 PROBLEM — N76.0 BACTERIAL VAGINAL INFECTION: Status: RESOLVED | Noted: 2021-11-23 | Resolved: 2022-05-26

## 2022-05-26 PROBLEM — F33.1 MODERATE EPISODE OF RECURRENT MAJOR DEPRESSIVE DISORDER (HCC): Status: RESOLVED | Noted: 2017-11-08 | Resolved: 2022-05-26

## 2022-05-26 LAB
APPEARANCE: CLEAR
BILIRUBIN: NEGATIVE
GLUCOSE (URINE DIPSTICK): NEGATIVE MG/DL
KETONES (URINE DIPSTICK): NEGATIVE MG/DL
MULTISTIX LOT#: ABNORMAL NUMERIC
NITRITE, URINE: NEGATIVE
OCCULT BLOOD: NEGATIVE
PH, URINE: 6.5 (ref 4.5–8)
SPECIFIC GRAVITY: 1.03 (ref 1–1.03)
URINE-COLOR: YELLOW
UROBILINOGEN,SEMI-QN: 1 MG/DL (ref 0–1.9)

## 2022-05-26 PROCEDURE — 3074F SYST BP LT 130 MM HG: CPT | Performed by: OBSTETRICS & GYNECOLOGY

## 2022-05-26 PROCEDURE — 81002 URINALYSIS NONAUTO W/O SCOPE: CPT | Performed by: OBSTETRICS & GYNECOLOGY

## 2022-05-26 PROCEDURE — 3078F DIAST BP <80 MM HG: CPT | Performed by: OBSTETRICS & GYNECOLOGY

## 2022-05-28 LAB — GROUP B STREP BY PCR FOR PCR OVT: NEGATIVE

## 2022-06-01 ENCOUNTER — ROUTINE PRENATAL (OUTPATIENT)
Dept: OBGYN CLINIC | Facility: CLINIC | Age: 32
End: 2022-06-01
Payer: COMMERCIAL

## 2022-06-01 VITALS
SYSTOLIC BLOOD PRESSURE: 122 MMHG | DIASTOLIC BLOOD PRESSURE: 83 MMHG | BODY MASS INDEX: 31 KG/M2 | WEIGHT: 183.63 LBS | HEART RATE: 76 BPM

## 2022-06-01 DIAGNOSIS — Z34.93 ENCOUNTER FOR SUPERVISION OF NORMAL PREGNANCY IN THIRD TRIMESTER, UNSPECIFIED GRAVIDITY: Primary | ICD-10-CM

## 2022-06-01 LAB
BILIRUBIN: NEGATIVE
GLUCOSE (URINE DIPSTICK): NEGATIVE MG/DL
KETONES (URINE DIPSTICK): NEGATIVE MG/DL
MULTISTIX LOT#: ABNORMAL NUMERIC
NITRITE, URINE: NEGATIVE
OCCULT BLOOD: NEGATIVE
PH, URINE: 7.5 (ref 4.5–8)
PROTEIN (URINE DIPSTICK): NEGATIVE MG/DL
SPECIFIC GRAVITY: 1 (ref 1–1.03)
UROBILINOGEN,SEMI-QN: 0.2 MG/DL (ref 0–1.9)

## 2022-06-01 PROCEDURE — 3079F DIAST BP 80-89 MM HG: CPT | Performed by: OBSTETRICS & GYNECOLOGY

## 2022-06-01 PROCEDURE — 81002 URINALYSIS NONAUTO W/O SCOPE: CPT | Performed by: OBSTETRICS & GYNECOLOGY

## 2022-06-01 PROCEDURE — 3074F SYST BP LT 130 MM HG: CPT | Performed by: OBSTETRICS & GYNECOLOGY

## 2022-06-01 NOTE — PROGRESS NOTES
Irregular contractions since this morning. Cx 2-3/50/-2 (unchanged from last week). Labor instructions reviewed. RTC 1 wk.

## 2022-06-08 ENCOUNTER — HOSPITAL ENCOUNTER (INPATIENT)
Facility: HOSPITAL | Age: 32
LOS: 2 days | Discharge: HOME | End: 2022-06-10
Attending: OBSTETRICS & GYNECOLOGY | Admitting: OBSTETRICS & GYNECOLOGY
Payer: COMMERCIAL

## 2022-06-08 ENCOUNTER — ANESTHESIA (OUTPATIENT)
Dept: OBGYN UNIT | Facility: HOSPITAL | Age: 32
End: 2022-06-08
Payer: COMMERCIAL

## 2022-06-08 ENCOUNTER — ANESTHESIA EVENT (OUTPATIENT)
Dept: OBGYN UNIT | Facility: HOSPITAL | Age: 32
End: 2022-06-08
Payer: COMMERCIAL

## 2022-06-08 ENCOUNTER — TELEPHONE (OUTPATIENT)
Dept: OBGYN CLINIC | Facility: CLINIC | Age: 32
End: 2022-06-08

## 2022-06-08 PROBLEM — Z34.90 PREGNANCY (HCC): Status: ACTIVE | Noted: 2022-06-08

## 2022-06-08 PROBLEM — Z34.90 PREGNANCY: Status: ACTIVE | Noted: 2022-06-08

## 2022-06-08 LAB
ANTIBODY SCREEN: NEGATIVE
BASOPHILS # BLD AUTO: 0.03 X10(3) UL (ref 0–0.2)
BASOPHILS NFR BLD AUTO: 0.3 %
DEPRECATED RDW RBC AUTO: 46.6 FL (ref 35.1–46.3)
EOSINOPHIL # BLD AUTO: 0.04 X10(3) UL (ref 0–0.7)
EOSINOPHIL NFR BLD AUTO: 0.4 %
ERYTHROCYTE [DISTWIDTH] IN BLOOD BY AUTOMATED COUNT: 13.9 % (ref 11–15)
HCT VFR BLD AUTO: 31.2 %
HGB BLD-MCNC: 10.2 G/DL
IMM GRANULOCYTES # BLD AUTO: 0.07 X10(3) UL (ref 0–1)
IMM GRANULOCYTES NFR BLD: 0.8 %
LYMPHOCYTES # BLD AUTO: 1.39 X10(3) UL (ref 1–4)
LYMPHOCYTES NFR BLD AUTO: 15.2 %
MCH RBC QN AUTO: 29.9 PG (ref 26–34)
MCHC RBC AUTO-ENTMCNC: 32.7 G/DL (ref 31–37)
MCV RBC AUTO: 91.5 FL
MONOCYTES # BLD AUTO: 0.64 X10(3) UL (ref 0.1–1)
MONOCYTES NFR BLD AUTO: 7 %
NEUTROPHILS # BLD AUTO: 6.97 X10 (3) UL (ref 1.5–7.7)
NEUTROPHILS # BLD AUTO: 6.97 X10(3) UL (ref 1.5–7.7)
NEUTROPHILS NFR BLD AUTO: 76.3 %
PLATELET # BLD AUTO: 272 10(3)UL (ref 150–450)
RBC # BLD AUTO: 3.41 X10(6)UL
RH BLOOD TYPE: POSITIVE
SARS-COV-2 RNA RESP QL NAA+PROBE: NOT DETECTED
WBC # BLD AUTO: 9.1 X10(3) UL (ref 4–11)

## 2022-06-08 PROCEDURE — 59400 OBSTETRICAL CARE: CPT | Performed by: OBSTETRICS & GYNECOLOGY

## 2022-06-08 RX ORDER — LIDOCAINE HYDROCHLORIDE 10 MG/ML
INJECTION, SOLUTION INFILTRATION; PERINEURAL
Status: COMPLETED | OUTPATIENT
Start: 2022-06-08 | End: 2022-06-08

## 2022-06-08 RX ORDER — NALBUPHINE HCL 10 MG/ML
2.5 AMPUL (ML) INJECTION
Status: DISCONTINUED | OUTPATIENT
Start: 2022-06-08 | End: 2022-06-10

## 2022-06-08 RX ORDER — ONDANSETRON 2 MG/ML
4 INJECTION INTRAMUSCULAR; INTRAVENOUS EVERY 6 HOURS PRN
Status: DISCONTINUED | OUTPATIENT
Start: 2022-06-08 | End: 2022-06-10

## 2022-06-08 RX ORDER — BUPIVACAINE HYDROCHLORIDE 2.5 MG/ML
20 INJECTION, SOLUTION EPIDURAL; INFILTRATION; INTRACAUDAL ONCE
Status: DISCONTINUED | OUTPATIENT
Start: 2022-06-08 | End: 2022-06-08 | Stop reason: HOSPADM

## 2022-06-08 RX ORDER — DIAPER,BRIEF,INFANT-TODD,DISP
1 EACH MISCELLANEOUS EVERY 6 HOURS PRN
Status: DISCONTINUED | OUTPATIENT
Start: 2022-06-08 | End: 2022-06-10

## 2022-06-08 RX ORDER — TRISODIUM CITRATE DIHYDRATE AND CITRIC ACID MONOHYDRATE 500; 334 MG/5ML; MG/5ML
30 SOLUTION ORAL AS NEEDED
Status: DISCONTINUED | OUTPATIENT
Start: 2022-06-08 | End: 2022-06-08 | Stop reason: HOSPADM

## 2022-06-08 RX ORDER — IBUPROFEN 600 MG/1
600 TABLET ORAL EVERY 6 HOURS PRN
Status: DISCONTINUED | OUTPATIENT
Start: 2022-06-08 | End: 2022-06-08 | Stop reason: HOSPADM

## 2022-06-08 RX ORDER — ACETAMINOPHEN 500 MG
500 TABLET ORAL EVERY 6 HOURS PRN
Status: DISCONTINUED | OUTPATIENT
Start: 2022-06-08 | End: 2022-06-10

## 2022-06-08 RX ORDER — DOCUSATE SODIUM 100 MG/1
100 CAPSULE, LIQUID FILLED ORAL
Status: DISCONTINUED | OUTPATIENT
Start: 2022-06-08 | End: 2022-06-09

## 2022-06-08 RX ORDER — BISACODYL 10 MG
10 SUPPOSITORY, RECTAL RECTAL ONCE AS NEEDED
Status: DISCONTINUED | OUTPATIENT
Start: 2022-06-08 | End: 2022-06-10

## 2022-06-08 RX ORDER — DEXTROSE, SODIUM CHLORIDE, SODIUM LACTATE, POTASSIUM CHLORIDE, AND CALCIUM CHLORIDE 5; .6; .31; .03; .02 G/100ML; G/100ML; G/100ML; G/100ML; G/100ML
INJECTION, SOLUTION INTRAVENOUS CONTINUOUS
Status: DISCONTINUED | OUTPATIENT
Start: 2022-06-08 | End: 2022-06-08 | Stop reason: HOSPADM

## 2022-06-08 RX ORDER — AMMONIA INHALANTS 0.04 G/.3ML
0.3 INHALANT RESPIRATORY (INHALATION) AS NEEDED
Status: DISCONTINUED | OUTPATIENT
Start: 2022-06-08 | End: 2022-06-10

## 2022-06-08 RX ORDER — ACETAMINOPHEN 500 MG
1000 TABLET ORAL EVERY 6 HOURS PRN
Status: DISCONTINUED | OUTPATIENT
Start: 2022-06-08 | End: 2022-06-10

## 2022-06-08 RX ORDER — ONDANSETRON 2 MG/ML
4 INJECTION INTRAMUSCULAR; INTRAVENOUS EVERY 6 HOURS PRN
Status: DISCONTINUED | OUTPATIENT
Start: 2022-06-08 | End: 2022-06-08 | Stop reason: HOSPADM

## 2022-06-08 RX ORDER — BUPIVACAINE HCL/0.9 % NACL/PF 0.25 %
5 PLASTIC BAG, INJECTION (ML) EPIDURAL AS NEEDED
Status: DISCONTINUED | OUTPATIENT
Start: 2022-06-08 | End: 2022-06-10

## 2022-06-08 RX ORDER — SIMETHICONE 80 MG
80 TABLET,CHEWABLE ORAL 3 TIMES DAILY PRN
Status: DISCONTINUED | OUTPATIENT
Start: 2022-06-08 | End: 2022-06-10

## 2022-06-08 RX ORDER — AMMONIA INHALANTS 0.04 G/.3ML
0.3 INHALANT RESPIRATORY (INHALATION) AS NEEDED
Status: DISCONTINUED | OUTPATIENT
Start: 2022-06-08 | End: 2022-06-08 | Stop reason: HOSPADM

## 2022-06-08 RX ORDER — ACETAMINOPHEN 500 MG
500 TABLET ORAL EVERY 6 HOURS PRN
Status: DISCONTINUED | OUTPATIENT
Start: 2022-06-08 | End: 2022-06-08 | Stop reason: HOSPADM

## 2022-06-08 RX ORDER — SODIUM CHLORIDE, SODIUM LACTATE, POTASSIUM CHLORIDE, CALCIUM CHLORIDE 600; 310; 30; 20 MG/100ML; MG/100ML; MG/100ML; MG/100ML
INJECTION, SOLUTION INTRAVENOUS AS NEEDED
Status: DISCONTINUED | OUTPATIENT
Start: 2022-06-08 | End: 2022-06-08 | Stop reason: HOSPADM

## 2022-06-08 RX ORDER — LIDOCAINE HYDROCHLORIDE AND EPINEPHRINE 15; 5 MG/ML; UG/ML
INJECTION, SOLUTION EPIDURAL
Status: COMPLETED | OUTPATIENT
Start: 2022-06-08 | End: 2022-06-08

## 2022-06-08 RX ORDER — TERBUTALINE SULFATE 1 MG/ML
0.25 INJECTION, SOLUTION SUBCUTANEOUS AS NEEDED
Status: DISCONTINUED | OUTPATIENT
Start: 2022-06-08 | End: 2022-06-08 | Stop reason: HOSPADM

## 2022-06-08 RX ORDER — LIDOCAINE HYDROCHLORIDE 10 MG/ML
30 INJECTION, SOLUTION EPIDURAL; INFILTRATION; INTRACAUDAL; PERINEURAL ONCE
Status: DISCONTINUED | OUTPATIENT
Start: 2022-06-08 | End: 2022-06-08 | Stop reason: HOSPADM

## 2022-06-08 RX ORDER — IBUPROFEN 600 MG/1
600 TABLET ORAL EVERY 6 HOURS
Status: DISCONTINUED | OUTPATIENT
Start: 2022-06-08 | End: 2022-06-09

## 2022-06-08 RX ADMIN — LIDOCAINE HYDROCHLORIDE 5 ML: 10 INJECTION, SOLUTION INFILTRATION; PERINEURAL at 20:26:00

## 2022-06-08 RX ADMIN — LIDOCAINE HYDROCHLORIDE AND EPINEPHRINE 5 ML: 15; 5 INJECTION, SOLUTION EPIDURAL at 20:26:00

## 2022-06-08 NOTE — PROGRESS NOTES
Pt is a 27year old female admitted to [place] [address]. Patient presents with:  Srom: gush of fluid at 1056     Pt is  38w4d intra-uterine pregnancy. History obtained, consents signed. Oriented to room, staff, and plan of care.

## 2022-06-08 NOTE — PLAN OF CARE
Problem: BIRTH - VAGINAL/ SECTION  Goal: Fetal and maternal status remain reassuring during the birth process  Description: INTERVENTIONS:  - Monitor vital signs  - Monitor fetal heart rate  - Monitor uterine activity  - Monitor labor progression (vaginal delivery)  - DVT prophylaxis (C/S delivery)  - Surgical antibiotic prophylaxis (C/S delivery)  Outcome: Progressing     Problem: PAIN - ADULT  Goal: Verbalizes/displays adequate comfort level or patient's stated pain goal  Description: INTERVENTIONS:  - Encourage pt to monitor pain and request assistance  - Assess pain using appropriate pain scale  - Administer analgesics based on type and severity of pain and evaluate response  - Implement non-pharmacological measures as appropriate and evaluate response  - Consider cultural and social influences on pain and pain management  - Manage/alleviate anxiety  - Utilize distraction and/or relaxation techniques  - Monitor for opioid side effects  - Notify MD/LIP if interventions unsuccessful or patient reports new pain  - Anticipate increased pain with activity and pre-medicate as appropriate  Outcome: Progressing     Problem: ANXIETY  Goal: Will report anxiety at manageable levels  Description: INTERVENTIONS:  - Administer medication as ordered  - Teach and rehearse alternative coping skills  - Provide emotional support with 1:1 interaction with staff  Outcome: Progressing     Problem: Patient Centered Care  Goal: Patient preferences are identified and integrated in the patient's plan of care  Description: Interventions:  - What would you like us to know as we care for you?  Baby Boy  - Provide timely, complete, and accurate information to patient/family  - Incorporate patient and family knowledge, values, beliefs, and cultural backgrounds into the planning and delivery of care  - Encourage patient/family to participate in care and decision-making at the level they choose  - Honor patient and family perspectives and choices  Outcome: Progressing  Patient's Long Term Goal:  Uncomplicated Vaginal Delivery    Interventions:  -Assessment  -Induction/Augmentation per protocol and MD order  -Education  -Intervention per protocol with education  -involve patient/family in POC  -See additional Care Plan goals for specific interventions    Patient's Short Term Goal:  Comfort and Pain Control    Interventions:  -Non Pharmacological pain interventions  -IV/IM and epidural pain medication per physician order and patient's request  -Education  -Involve patient in POC

## 2022-06-08 NOTE — TELEPHONE ENCOUNTER
Patient is 38w4d  calling to report that water broke at 1050 today. She states she had a warm gush about 10 minutes after emptying bladder, so confident not urine. She denies contractions at this time, having light cramping. She has been jose off and on x 2 weeks. +FM. Denies VB. Blood type O neg. GBS negative. Directed patient to [facility] with broken bag. She states she will be there in about 1 hour. Attempt made to page [facility]. Notified [facility].

## 2022-06-09 LAB
BASOPHILS # BLD AUTO: 0.02 X10(3) UL (ref 0–0.2)
BASOPHILS NFR BLD AUTO: 0.2 %
DEPRECATED RDW RBC AUTO: 47.2 FL (ref 35.1–46.3)
EOSINOPHIL # BLD AUTO: 0.03 X10(3) UL (ref 0–0.7)
EOSINOPHIL NFR BLD AUTO: 0.3 %
ERYTHROCYTE [DISTWIDTH] IN BLOOD BY AUTOMATED COUNT: 13.8 % (ref 11–15)
HCT VFR BLD AUTO: 33.2 %
HGB BLD-MCNC: 10.4 G/DL
IMM GRANULOCYTES # BLD AUTO: 0.09 X10(3) UL (ref 0–1)
IMM GRANULOCYTES NFR BLD: 1 %
LYMPHOCYTES # BLD AUTO: 1.62 X10(3) UL (ref 1–4)
LYMPHOCYTES NFR BLD AUTO: 18.2 %
MCH RBC QN AUTO: 29.3 PG (ref 26–34)
MCHC RBC AUTO-ENTMCNC: 31.3 G/DL (ref 31–37)
MCV RBC AUTO: 93.5 FL
MONOCYTES # BLD AUTO: 0.69 X10(3) UL (ref 0.1–1)
MONOCYTES NFR BLD AUTO: 7.8 %
NEUTROPHILS # BLD AUTO: 6.44 X10 (3) UL (ref 1.5–7.7)
NEUTROPHILS # BLD AUTO: 6.44 X10(3) UL (ref 1.5–7.7)
NEUTROPHILS NFR BLD AUTO: 72.5 %
PLATELET # BLD AUTO: 275 10(3)UL (ref 150–450)
RBC # BLD AUTO: 3.55 X10(6)UL
WBC # BLD AUTO: 8.9 X10(3) UL (ref 4–11)

## 2022-06-09 RX ORDER — IBUPROFEN 600 MG/1
600 TABLET ORAL EVERY 6 HOURS PRN
Status: DISCONTINUED | OUTPATIENT
Start: 2022-06-09 | End: 2022-06-10

## 2022-06-09 RX ORDER — DOCUSATE SODIUM 100 MG/1
100 CAPSULE, LIQUID FILLED ORAL 2 TIMES DAILY
Status: DISCONTINUED | OUTPATIENT
Start: 2022-06-09 | End: 2022-06-10

## 2022-06-09 NOTE — L&D DELIVERY NOTE
[facility]    Vaginal Delivery Note    Juanito Guzman Patient Status:  Inpatient    3/9/1990 MRN [id number]   Location [address] Attending Vega Corona MD   Hosp Day # 0 PCP Dee Dee Haque MD     Delivery     Infant Info:  Date of Delivery: 2022   Time of Delivery: 9:23 PM  Delivery Type: Normal spontaneous vaginal delivery    Live Information for the patient's : Mare Kulkarni [[id number]]   male infant Information for the patient's : Mare Kulkarni [[id number]]   7 lb 7.2 oz (3.38 kg)   Apgars:  1 minute: 8               5 minutes: 9                        10 minutes:      Presentation Vertex [1]    Position Right [3] Occiput [1] Anterior [1]    Delivery Comment: Baby OA. Shoulder atraumatically delivered followed by the trunk and LE. Nasopharyngeally bulb suctioned at the perineum. Cord clamped and cut. Baby handed to the awaiting nursing personal. Placenta delivered complete and intact with a 3 VC. Perineum intact, no lacerations. Sponge count correct. Placenta sent to pathology. Economy and patient stable in the delivery room with nursing present. Maternal Anesthesia: epidural         Delivery Complications:  none    Neonatologist Present: no    Placenta info:  Date/Time of Delivery: 2022  9:27 PM   Delivery: spontaneous  Placenta to Pathology: yes due to covid in pregnancy    Cord info:  Cord Gases Submitted: no  Cord Blood Collection: no  Cord Tissue Collection: no  Cord Complications: none    Sponge and Needle Counts:  Verified    Quantitative Blood Loss (mL) 200    Jennifer Fulton MD   2022  10:24 PM

## 2022-06-09 NOTE — DISCHARGE SUMMARY
[facility]    Discharge Summary    Alyssa Carrillo Patient Status:  Inpatient    3/9/1990 MRN [id number]   Location [address] Attending Jvaon Sylvester MD   Hosp Day # 0       Admission date:  2022    Delivering OB Clinician: Page    [facility]: Estimated Date of Delivery: 22    Gestational Age: 38w4d    Antepartum complications: Patient Active Problem List:     HUGO (generalized anxiety disorder)     Vaginal bleeding in pregnancy, second trimester     Anal bleeding     COVID-19 affecting pregnancy, antepartum     Pregnancy      Date of Delivery: 2022 Time of Delivery: 9:23 PM    Delivery Type: spontaneous vaginal delivery    Baby: Liveborn male Information for the patient's : Rebekah Ceja [[id number]]   7 lb 7.2 oz (3.38 kg)  Apgars:  1 minute: 8  5 minutes: 910 minutes:       Intrapartum Complications: None    Discharge Date: 6/10/2022    Hospital Course:  Pt admitted with SROM at 11am today and progressed on pitocin to an  of a viable male infant over intact perineum. No lacerations. No complications.  Routine delivery and postpartum care    Discharged Condition: stable    Disposition: home    Plan:     Follow-up appointment in 6 weeks with Dr. Deepika Nicholas        2022  11:06 PM

## 2022-06-09 NOTE — PROGRESS NOTES
Patient up to bathroom with assist x 2. Unable to void at this time. Patient transferred to mother/baby room 366 per wheelchair in stable condition with baby and personal belongings. Accompanied by significant other and staff. Report given to [facility].

## 2022-06-09 NOTE — ANESTHESIA PROCEDURE NOTES
Labor Analgesia    Date/Time: 6/8/2022 8:26 PM  Performed by: Lashay Redmond MD  Authorized by: Lashay Redmond MD       General Information and Staff    Start Time:  6/8/2022 8:11 PM  End Time:  6/8/2022 8:29 PM  Anesthesiologist:  Lashay Redmond MD  Performed by:   Anesthesiologist  Patient Location:  OB  Site Identification: surface landmarks    Reason for Block: labor epidural    Preanesthetic Checklist: patient identified, IV checked, risks and benefits discussed, monitors and equipment checked, pre-op evaluation, timeout performed, anesthesia consent and sterile technique used      Procedure Details    Patient Position:  Sitting  Prep: patient draped and DuraPrep    Monitoring:  Heart rate, cardiac monitor and continuous pulse ox  Approach:  Midline    Epidural Needle    Injection Technique:  DENG saline  Needle Type:  Tuohy  Needle Gauge:  18 G  Needle Length:  3.375 in  Needle Insertion Depth:  5  Location:  L3-4    Spinal Needle      Catheter    Catheter Type:  Multi-orifice  Catheter Size:  20 G  Catheter at Skin Depth:  10  Test Dose:  Negative    Assessment  Sensory Level:  T8    Additional Comments

## 2022-06-10 VITALS
TEMPERATURE: 98 F | HEART RATE: 75 BPM | SYSTOLIC BLOOD PRESSURE: 121 MMHG | DIASTOLIC BLOOD PRESSURE: 77 MMHG | OXYGEN SATURATION: 99 % | RESPIRATION RATE: 16 BRPM

## 2022-06-10 PROBLEM — Z34.90 PREGNANCY (HCC): Status: RESOLVED | Noted: 2022-06-08 | Resolved: 2022-06-10

## 2022-06-10 PROBLEM — Z34.90 PREGNANCY: Status: RESOLVED | Noted: 2022-06-08 | Resolved: 2022-06-10

## 2022-06-10 RX ORDER — IBUPROFEN 600 MG/1
600 TABLET ORAL EVERY 6 HOURS PRN
Qty: 30 TABLET | Refills: 0 | Status: SHIPPED | OUTPATIENT
Start: 2022-06-10

## 2022-06-10 NOTE — PLAN OF CARE
Sat with patient to review plan of care. Discussed analgesic options and answered all questions. VSS. Breastfeeding on demand. Breastfeeding successfully. Voiding independently. Lochia is WNL. Uterus is firm.      Problem: PAIN - ADULT  Goal: Verbalizes/displays adequate comfort level or patient's stated pain goal  Description: INTERVENTIONS:  - Encourage pt to monitor pain and request assistance  - Assess pain using appropriate pain scale  - Administer analgesics based on type and severity of pain and evaluate response  - Implement non-pharmacological measures as appropriate and evaluate response  - Consider cultural and social influences on pain and pain management  - Manage/alleviate anxiety  - Utilize distraction and/or relaxation techniques  - Monitor for opioid side effects  - Notify MD/LIP if interventions unsuccessful or patient reports new pain  - Anticipate increased pain with activity and pre-medicate as appropriate  Outcome: Completed     Problem: ANXIETY  Goal: Will report anxiety at manageable levels  Description: INTERVENTIONS:  - Administer medication as ordered  - Teach and rehearse alternative coping skills  - Provide emotional support with 1:1 interaction with staff  Outcome: Completed     Problem: Patient Centered Care  Goal: Patient preferences are identified and integrated in the patient's plan of care  Description: Interventions:  - What would you like us to know as we care for you?   - Provide timely, complete, and accurate information to patient/family  - Incorporate patient and family knowledge, values, beliefs, and cultural backgrounds into the planning and delivery of care  - Encourage patient/family to participate in care and decision-making at the level they choose  - Honor patient and family perspectives and choices  Outcome: Completed     Problem: POSTPARTUM  Goal: Long Term Goal:Experiences normal postpartum course  Description: INTERVENTIONS:  - Assess and monitor vital signs and lab values. - Assess fundus and lochia. - Provide ice/sitz baths for perineum discomfort. - Monitor healing of incision/episiotomy/laceration, and assess for signs and symptoms of infection and hematoma. - Assess bladder function and monitor for bladder distention.  - Provide/instruct/assist with pericare as needed. - Provide VTE prophylaxis as needed. - Monitor bowel function.  - Encourage ambulation and provide assistance as needed. - Assess and monitor emotional status and provide social service/psych resources as needed. - Utilize standard precautions and use personal protective equipment as indicated. Ensure aseptic care of all intravenous lines and invasive tubes/drains.  - Obtain immunization and exposure to communicable diseases history. Outcome: Completed  Goal: Optimize infant feeding at the breast  Description: INTERVENTIONS:  - Initiate breast feeding within first hour after birth. - Monitor effectiveness of current breast feeding efforts. - Assess support systems available to mother/family.  - Identify cultural beliefs/practices regarding lactation, letdown techniques, maternal food preferences. - Assess mother's knowledge and previous experience with breast feeding.  - Provide information as needed about early infant feeding cues (e.g., rooting, lip smacking, sucking fingers/hand) versus late cue of crying.  - Discuss/demonstrate breast feeding aids (e.g., infant sling, nursing footstool/pillows, and breast pumps). - Encourage mother/other family members to express feelings/concerns, and actively listen. - Educate father/SO about benefits of breast feeding and how to manage common lactation challenges. - Recommend avoidance of specific medications or substances incompatible with breast feeding.  - Assess and monitor for signs of nipple pain/trauma. - Instruct and provide assistance with proper latch. - Review techniques for milk expression (breast pumping) and storage of breast milk. Provide pumping equipment/supplies, instructions and assistance, as needed. - Encourage rooming-in and breast feeding on demand.  - Encourage skin-to-skin contact. - Provide LC support as needed. - Assess for and manage engorgement. - Provide breast feeding education handouts and information on community breast feeding support. Outcome: Completed  Goal: Establishment of adequate milk supply with medication/procedure interruptions  Description: INTERVENTIONS:  - Review techniques for milk expression (breast pumping). - Provide pumping equipment/supplies, instructions, and assistance until it is safe to breastfeed infant. Outcome: Completed  Goal: Experiences normal breast weaning course  Description: INTERVENTIONS:  - Assess for and manage engorgement. - Instruct on breast care. - Provide comfort measures. Outcome: Completed  Goal: Appropriate maternal -  bonding  Description: INTERVENTIONS:  - Assess caregiver- interactions. - Assess caregiver's emotional status and coping mechanisms. - Encourage caregiver to participate in  daily care. - Assess support systems available to mother/family.  - Provide /case management support as needed.   Outcome: Completed

## 2022-06-15 ENCOUNTER — TELEPHONE (OUTPATIENT)
Dept: OBGYN CLINIC | Facility: CLINIC | Age: 32
End: 2022-06-15

## 2022-06-15 NOTE — TELEPHONE ENCOUNTER
Patient delivered 6/8/22 calling to report fever. Temp was 99.9F last night, improved with dose of tylenol. She just woke up from nap around 30 minutes ago and temp is 101.5 and has shaking chills. She states she has mild low belly cramps last night, not really painful. She denies lower abdominal tenderess to palpation. Denies discharge or odor. Bleeding is like a light period and red/brown. Denies UTI symptoms. She states breasts are really engorged and bilaterally painful. She is breastfeeding and pumping. She states they feel hot and \"really full. \" Nipples are cracked and bloody. She is using silicon patches with some relief. She denies redness or lumps. She states she has had body aches since delivery. She also notes she has had nasal congestion since prior to delivery, worse at night. Denies cough or sneezing. Denies GI upset. Directed to take a dose of tylenol or motrin and empty breasts. Increase hydration. She will take a home covid test d/t cold symptoms. Advised I will check with on-call for further recs. To LUCÍAK to review and advise.

## 2022-06-15 NOTE — TELEPHONE ENCOUNTER
Call placed to patient. Covid test was negative. She repeated temp, down to 101.0 F tympanic. Directed patient to take tylenol on a schedule per box instructions until appt tomorrow. Push hydration. If fever spiking or symptoms worsening over-night, needs to page on-call to discuss. Otherwise place for appt tomorrow with [facility]- 2pm. Patient verbalized understanding.

## 2022-06-15 NOTE — TELEPHONE ENCOUNTER
Call placed to patient. She states at this time temp has increased to 102 F. She took a dose of motrin and tylenol after we hung up. She states she has not yet had time to take home covid test. Advised to check test. I will call her back in 15 minutes for results and will recheck temp on the phone at that time. If still elevated I will page on-call to discuss.

## 2022-06-16 ENCOUNTER — OFFICE VISIT (OUTPATIENT)
Dept: OBGYN CLINIC | Facility: CLINIC | Age: 32
End: 2022-06-16
Payer: COMMERCIAL

## 2022-06-16 VITALS
DIASTOLIC BLOOD PRESSURE: 77 MMHG | HEART RATE: 74 BPM | SYSTOLIC BLOOD PRESSURE: 118 MMHG | WEIGHT: 175.19 LBS | BODY MASS INDEX: 29 KG/M2 | TEMPERATURE: 98 F

## 2022-06-16 DIAGNOSIS — N61.0 MASTITIS, RIGHT, ACUTE: Primary | ICD-10-CM

## 2022-06-16 PROCEDURE — 99213 OFFICE O/P EST LOW 20 MIN: CPT | Performed by: OBSTETRICS & GYNECOLOGY

## 2022-06-16 PROCEDURE — 3078F DIAST BP <80 MM HG: CPT | Performed by: OBSTETRICS & GYNECOLOGY

## 2022-06-16 PROCEDURE — 3074F SYST BP LT 130 MM HG: CPT | Performed by: OBSTETRICS & GYNECOLOGY

## 2022-06-16 RX ORDER — FLUCONAZOLE 150 MG/1
150 TABLET ORAL ONCE
Qty: 2 TABLET | Refills: 0 | Status: SHIPPED | OUTPATIENT
Start: 2022-06-16 | End: 2022-06-16

## 2022-06-23 ENCOUNTER — TELEPHONE (OUTPATIENT)
Dept: OBGYN UNIT | Facility: HOSPITAL | Age: 32
End: 2022-06-23

## 2022-07-03 ENCOUNTER — HOSPITAL ENCOUNTER (EMERGENCY)
Facility: HOSPITAL | Age: 32
Discharge: HOME | End: 2022-07-03
Attending: EMERGENCY MEDICINE
Payer: COMMERCIAL

## 2022-07-03 ENCOUNTER — APPOINTMENT (OUTPATIENT)
Dept: ULTRASOUND IMAGING | Facility: HOSPITAL | Age: 32
End: 2022-07-03
Attending: EMERGENCY MEDICINE
Payer: COMMERCIAL

## 2022-07-03 VITALS
HEIGHT: 65 IN | OXYGEN SATURATION: 99 % | HEART RATE: 110 BPM | SYSTOLIC BLOOD PRESSURE: 128 MMHG | DIASTOLIC BLOOD PRESSURE: 89 MMHG | BODY MASS INDEX: 28.32 KG/M2 | TEMPERATURE: 101 F | WEIGHT: 170 LBS | RESPIRATION RATE: 18 BRPM

## 2022-07-03 DIAGNOSIS — N61.0 MASTITIS: ICD-10-CM

## 2022-07-03 DIAGNOSIS — N30.00 ACUTE CYSTITIS WITHOUT HEMATURIA: Primary | ICD-10-CM

## 2022-07-03 DIAGNOSIS — R10.9 ABDOMINAL PAIN, ACUTE: ICD-10-CM

## 2022-07-03 LAB
ANION GAP SERPL CALC-SCNC: 8 MMOL/L (ref 0–18)
BASOPHILS # BLD AUTO: 0.03 X10(3) UL (ref 0–0.2)
BASOPHILS NFR BLD AUTO: 0.3 %
BILIRUB UR QL: NEGATIVE
BUN BLD-MCNC: 14 MG/DL (ref 7–18)
BUN/CREAT SERPL: 19.7 (ref 10–20)
CALCIUM BLD-MCNC: 9 MG/DL (ref 8.5–10.1)
CHLORIDE SERPL-SCNC: 109 MMOL/L (ref 98–112)
CLARITY UR: CLEAR
CO2 SERPL-SCNC: 23 MMOL/L (ref 21–32)
COLOR UR: YELLOW
CREAT BLD-MCNC: 0.71 MG/DL
DEPRECATED RDW RBC AUTO: 45 FL (ref 35.1–46.3)
EOSINOPHIL # BLD AUTO: 0.09 X10(3) UL (ref 0–0.7)
EOSINOPHIL NFR BLD AUTO: 1 %
ERYTHROCYTE [DISTWIDTH] IN BLOOD BY AUTOMATED COUNT: 13.4 % (ref 11–15)
GLUCOSE BLD-MCNC: 104 MG/DL (ref 70–99)
GLUCOSE UR-MCNC: NEGATIVE MG/DL
HCT VFR BLD AUTO: 36.9 %
HGB BLD-MCNC: 11.5 G/DL
IMM GRANULOCYTES # BLD AUTO: 0.04 X10(3) UL (ref 0–1)
IMM GRANULOCYTES NFR BLD: 0.4 %
LYMPHOCYTES # BLD AUTO: 1.65 X10(3) UL (ref 1–4)
LYMPHOCYTES NFR BLD AUTO: 17.7 %
MCH RBC QN AUTO: 28.3 PG (ref 26–34)
MCHC RBC AUTO-ENTMCNC: 31.2 G/DL (ref 31–37)
MCV RBC AUTO: 90.7 FL
MONOCYTES # BLD AUTO: 0.61 X10(3) UL (ref 0.1–1)
MONOCYTES NFR BLD AUTO: 6.5 %
NEUTROPHILS # BLD AUTO: 6.92 X10 (3) UL (ref 1.5–7.7)
NEUTROPHILS # BLD AUTO: 6.92 X10(3) UL (ref 1.5–7.7)
NEUTROPHILS NFR BLD AUTO: 74.1 %
NITRITE UR QL STRIP.AUTO: NEGATIVE
OSMOLALITY SERPL CALC.SUM OF ELEC: 291 MOSM/KG (ref 275–295)
PH UR: 6 [PH] (ref 5–8)
PLATELET # BLD AUTO: 435 10(3)UL (ref 150–450)
POTASSIUM SERPL-SCNC: 3.7 MMOL/L (ref 3.5–5.1)
PROT UR-MCNC: NEGATIVE MG/DL
RBC # BLD AUTO: 4.07 X10(6)UL
RBC #/AREA URNS AUTO: >10 /HPF
SARS-COV-2 RNA RESP QL NAA+PROBE: NOT DETECTED
SODIUM SERPL-SCNC: 140 MMOL/L (ref 136–145)
SP GR UR STRIP: >1.03 (ref 1–1.03)
UROBILINOGEN UR STRIP-ACNC: <2
VIT C UR-MCNC: NEGATIVE MG/DL
WBC # BLD AUTO: 9.3 X10(3) UL (ref 4–11)

## 2022-07-03 PROCEDURE — 81001 URINALYSIS AUTO W/SCOPE: CPT | Performed by: EMERGENCY MEDICINE

## 2022-07-03 PROCEDURE — 36415 COLL VENOUS BLD VENIPUNCTURE: CPT

## 2022-07-03 PROCEDURE — 76830 TRANSVAGINAL US NON-OB: CPT | Performed by: EMERGENCY MEDICINE

## 2022-07-03 PROCEDURE — 76856 US EXAM PELVIC COMPLETE: CPT | Performed by: EMERGENCY MEDICINE

## 2022-07-03 PROCEDURE — 87086 URINE CULTURE/COLONY COUNT: CPT | Performed by: EMERGENCY MEDICINE

## 2022-07-03 PROCEDURE — 99284 EMERGENCY DEPT VISIT MOD MDM: CPT

## 2022-07-03 PROCEDURE — 93975 VASCULAR STUDY: CPT | Performed by: EMERGENCY MEDICINE

## 2022-07-03 PROCEDURE — 99285 EMERGENCY DEPT VISIT HI MDM: CPT

## 2022-07-03 PROCEDURE — 85025 COMPLETE CBC W/AUTO DIFF WBC: CPT | Performed by: EMERGENCY MEDICINE

## 2022-07-03 PROCEDURE — 80048 BASIC METABOLIC PNL TOTAL CA: CPT | Performed by: EMERGENCY MEDICINE

## 2022-07-03 RX ORDER — ACETAMINOPHEN 500 MG
1000 TABLET ORAL ONCE
Status: COMPLETED | OUTPATIENT
Start: 2022-07-03 | End: 2022-07-03

## 2022-07-03 RX ORDER — AMOXICILLIN AND CLAVULANATE POTASSIUM 875; 125 MG/1; MG/1
1 TABLET, FILM COATED ORAL 2 TIMES DAILY
Qty: 20 TABLET | Refills: 0 | Status: SHIPPED | OUTPATIENT
Start: 2022-07-03 | End: 2022-07-06

## 2022-07-03 RX ORDER — CEPHALEXIN 500 MG/1
500 CAPSULE ORAL 2 TIMES DAILY
Qty: 14 CAPSULE | Refills: 0 | Status: SHIPPED | OUTPATIENT
Start: 2022-07-03 | End: 2022-07-03

## 2022-07-03 NOTE — ED INITIAL ASSESSMENT (HPI)
Patient arrives ambulatory through triage with c/o of pelvic pain and a fever since Friday. Patient states that she is 3.5 weeks post-partum.  Last tylenol 0300

## 2022-07-03 NOTE — ED PROVIDER NOTES
Re-exam, mild tenderness xtreme left lower pelvis to palpation. Bilateral breast exam, mild tenderness and warmth without palpable abscess. Discussed with Dr. Myra Newton, Will discharge on Augmentin.

## 2022-07-03 NOTE — ED QUICK NOTES
Rounding Completed    Plan of Care reviewed. Waiting for ultrasound report. Elimination needs assessed. Provided update, rechecked temp. Bed is locked and in lowest position. Call light within reach.

## 2022-07-05 ENCOUNTER — TELEPHONE (OUTPATIENT)
Dept: OBGYN CLINIC | Facility: CLINIC | Age: 32
End: 2022-07-05

## 2022-07-05 NOTE — TELEPHONE ENCOUNTER
Pt delivered on 6/8 and was seen in the ER over the weekend for abdominal pain. Pt stated the pain started in her LLQ and began to have low grade temp of 100-101 that was not resolving with fever reducing meds. Pt also reported chills. Pt stated she spoke with Chetan Quarles over the weekend who was on call and was advised to go to ER. Pt stated in the ER she was told she had mastitis in both breasts, a bladder infection, and endometritis. Pt stated she was discharged home on antibiotics and told to f/u with our office. Pt scheduled appt with [facility] tomorrow.

## 2022-07-06 ENCOUNTER — OFFICE VISIT (OUTPATIENT)
Dept: OBGYN CLINIC | Facility: CLINIC | Age: 32
End: 2022-07-06
Payer: COMMERCIAL

## 2022-07-06 ENCOUNTER — TELEPHONE (OUTPATIENT)
Dept: OBGYN CLINIC | Facility: CLINIC | Age: 32
End: 2022-07-06

## 2022-07-06 VITALS
DIASTOLIC BLOOD PRESSURE: 82 MMHG | SYSTOLIC BLOOD PRESSURE: 120 MMHG | TEMPERATURE: 98 F | HEART RATE: 73 BPM | WEIGHT: 172.38 LBS | BODY MASS INDEX: 29 KG/M2

## 2022-07-06 DIAGNOSIS — N61.0 MASTITIS, LEFT, ACUTE: Primary | ICD-10-CM

## 2022-07-06 PROCEDURE — 3079F DIAST BP 80-89 MM HG: CPT | Performed by: OBSTETRICS & GYNECOLOGY

## 2022-07-06 PROCEDURE — 3074F SYST BP LT 130 MM HG: CPT | Performed by: OBSTETRICS & GYNECOLOGY

## 2022-07-06 PROCEDURE — 99213 OFFICE O/P EST LOW 20 MIN: CPT | Performed by: OBSTETRICS & GYNECOLOGY

## 2022-07-06 RX ORDER — CEPHALEXIN 500 MG/1
500 CAPSULE ORAL 2 TIMES DAILY
COMMUNITY
Start: 2022-07-03 | End: 2022-07-06

## 2022-07-06 NOTE — TELEPHONE ENCOUNTER
[facility] calling about RX Dicloxacillin would like to discuss.  Pt has gotton 3 antibiotics from 3 different doctor's

## 2022-07-06 NOTE — TELEPHONE ENCOUNTER
Pharmacist Pk Baeza states pt recently picked up Augmentin on 7/3 and then Keflex on 7/4 and new rx today. Pharmacist wants to make sure [facility] is aware. Informed that [facility] discontinued these antibiotics today and switched pt to dicloxacillin. Pharmacist states the dicloxacillin 500 mg dose is on back order and they will fill it with 250 mg tabs #56 tabs.

## 2022-07-27 ENCOUNTER — TELEPHONE (OUTPATIENT)
Dept: OBGYN CLINIC | Facility: CLINIC | Age: 32
End: 2022-07-27

## 2022-07-27 ENCOUNTER — POSTPARTUM (OUTPATIENT)
Dept: OBGYN CLINIC | Facility: CLINIC | Age: 32
End: 2022-07-27
Payer: COMMERCIAL

## 2022-07-27 VITALS
BODY MASS INDEX: 29 KG/M2 | SYSTOLIC BLOOD PRESSURE: 121 MMHG | HEART RATE: 86 BPM | WEIGHT: 172 LBS | DIASTOLIC BLOOD PRESSURE: 86 MMHG

## 2022-07-27 DIAGNOSIS — Z97.5 IUD CONTRACEPTION: Primary | ICD-10-CM

## 2022-07-27 DIAGNOSIS — Z30.014 ENCOUNTER FOR INITIAL PRESCRIPTION OF INTRAUTERINE CONTRACEPTIVE DEVICE (IUD): ICD-10-CM

## 2022-07-27 PROCEDURE — 3079F DIAST BP 80-89 MM HG: CPT | Performed by: OBSTETRICS & GYNECOLOGY

## 2022-07-27 PROCEDURE — 3074F SYST BP LT 130 MM HG: CPT | Performed by: OBSTETRICS & GYNECOLOGY

## 2022-07-27 RX ORDER — DICLOXACILLIN SODIUM 250 MG/1
250 CAPSULE ORAL 4 TIMES DAILY
COMMUNITY
Start: 2022-07-06

## 2022-08-03 ENCOUNTER — PATIENT MESSAGE (OUTPATIENT)
Dept: OBGYN CLINIC | Facility: CLINIC | Age: 32
End: 2022-08-03

## 2022-08-03 NOTE — TELEPHONE ENCOUNTER
From: Lily Sotelo  To: Mikala Pulido. [address] [facility],   Sent: 8/3/2022 4:53 PM CDT  Subject: Mastitis Again    Hi  [address] [place] [address],     I have mastitis un my right breast again. It's spot on the bottom side that's very tender to the touch. I think I got the clog out but the pain and heat hasn't subsided.

## 2022-08-03 NOTE — TELEPHONE ENCOUNTER
Patient messaging with concern for mastitis. Delivered 6/8/22. PP 7/27/22 with [facility]. Hx mastitis 6/16 R breast, 7/6 L breast, treated with dicloxacillin She is experiencing warmth, pain and tenderness to right breast.     To NJG on call- please advise if okay to treat?

## 2022-09-12 ENCOUNTER — TELEPHONE (OUTPATIENT)
Dept: OBGYN CLINIC | Facility: CLINIC | Age: 32
End: 2022-09-12

## 2022-09-12 ENCOUNTER — LAB ENCOUNTER (OUTPATIENT)
Dept: LAB | Facility: HOSPITAL | Age: 32
End: 2022-09-12
Attending: OBSTETRICS & GYNECOLOGY
Payer: COMMERCIAL

## 2022-09-12 DIAGNOSIS — Z30.014 ENCOUNTER FOR INITIAL PRESCRIPTION OF INTRAUTERINE CONTRACEPTIVE DEVICE (IUD): ICD-10-CM

## 2022-09-12 LAB — HCG SERPL QL: NEGATIVE

## 2022-09-12 PROCEDURE — 84703 CHORIONIC GONADOTROPIN ASSAY: CPT

## 2022-09-12 PROCEDURE — 36415 COLL VENOUS BLD VENIPUNCTURE: CPT

## 2022-09-12 NOTE — TELEPHONE ENCOUNTER
See 7/27/22 [facility] visit notes. Pt to return for Mirena IUD insert after neg serum hcg. (Referral authorized.)  Pt self scheduled for insertion with [facility]. Informed pt we need to check if okay to schedule with other provider different from provider who counseled on IUD. Pt states she scheduled with [facility] because [facility] does not have any openings for months. Offered pt a canceled appt slot tomorrow afternoon with [facility] and pt accepts. Reminded pt to do serum hcg today. Provided lab hours.

## 2022-09-13 ENCOUNTER — OFFICE VISIT (OUTPATIENT)
Dept: OBGYN CLINIC | Facility: CLINIC | Age: 32
End: 2022-09-13
Payer: COMMERCIAL

## 2022-09-13 VITALS
WEIGHT: 177.81 LBS | HEART RATE: 82 BPM | BODY MASS INDEX: 30 KG/M2 | SYSTOLIC BLOOD PRESSURE: 118 MMHG | DIASTOLIC BLOOD PRESSURE: 84 MMHG

## 2022-09-13 DIAGNOSIS — Z30.430 ENCOUNTER FOR INSERTION OF INTRAUTERINE CONTRACEPTIVE DEVICE (IUD): Primary | ICD-10-CM

## 2022-09-13 PROCEDURE — 3079F DIAST BP 80-89 MM HG: CPT | Performed by: OBSTETRICS & GYNECOLOGY

## 2022-09-13 PROCEDURE — 3074F SYST BP LT 130 MM HG: CPT | Performed by: OBSTETRICS & GYNECOLOGY

## 2022-09-13 PROCEDURE — 58300 INSERT INTRAUTERINE DEVICE: CPT | Performed by: OBSTETRICS & GYNECOLOGY

## 2022-09-13 RX ORDER — HYDROXYZINE HYDROCHLORIDE 10 MG/1
10 TABLET, FILM COATED ORAL 3 TIMES DAILY PRN
COMMUNITY

## 2022-09-13 NOTE — PROCEDURES
IUD Insertion     Pregnancy Results: negative from n/a test   Birth control method(s) used:  none    Consent signed. Procedure discussed with the patient in detail including indication, risks, benefits, alternatives and complications. Pelvic Exam Findings:      Procedure:  Speculum placed in the vagina. Betadine wash of vagina and cervix. Single tooth tenaculum was placed at the 12 o'clock position. Uterus sounded to 7 cm. Mirena IUD was placed without difficulty. Strings cut at 3 cm. Single tooth tenaculum removed. Silver nitrate used to achieve hemostasis. Good hemostasis noted. Patient tolerated procedure well. Visit Plan:  IUD surveillance was discussed with the patient.

## 2022-09-20 ENCOUNTER — APPOINTMENT (OUTPATIENT)
Dept: ULTRASOUND IMAGING | Facility: HOSPITAL | Age: 32
End: 2022-09-20
Attending: EMERGENCY MEDICINE

## 2022-09-20 ENCOUNTER — HOSPITAL ENCOUNTER (EMERGENCY)
Facility: HOSPITAL | Age: 32
Discharge: HOME | End: 2022-09-21
Attending: EMERGENCY MEDICINE

## 2022-09-20 VITALS
SYSTOLIC BLOOD PRESSURE: 109 MMHG | HEIGHT: 65 IN | TEMPERATURE: 98 F | DIASTOLIC BLOOD PRESSURE: 77 MMHG | BODY MASS INDEX: 28.32 KG/M2 | HEART RATE: 83 BPM | OXYGEN SATURATION: 98 % | RESPIRATION RATE: 15 BRPM | WEIGHT: 170 LBS

## 2022-09-20 DIAGNOSIS — M79.605 LEFT LEG PAIN: Primary | ICD-10-CM

## 2022-09-20 PROCEDURE — 99284 EMERGENCY DEPT VISIT MOD MDM: CPT

## 2022-09-20 PROCEDURE — 93971 EXTREMITY STUDY: CPT | Performed by: EMERGENCY MEDICINE

## 2022-09-20 RX ORDER — IBUPROFEN 600 MG/1
600 TABLET ORAL ONCE
Status: COMPLETED | OUTPATIENT
Start: 2022-09-20 | End: 2022-09-20

## 2022-09-21 NOTE — ED QUICK NOTES
Redness, warmth noted to back of LLE. Patient also states that she noticed L foot swelling while waiting in the waiting room. No hx of blood clots. IUD placement last week.  13 weeks postpartum

## 2022-09-21 NOTE — ED INITIAL ASSESSMENT (HPI)
Patient here with atraumatic left posterior lower leg redness/swelling. Denies recent travel. Non-smoker. 13 weeks postpartum.

## 2022-09-23 ENCOUNTER — PATIENT OUTREACH (OUTPATIENT)
Dept: CASE MANAGEMENT | Age: 32
End: 2022-09-23

## 2022-09-23 NOTE — PROGRESS NOTES
1st attempt PCP fup apt request    [facility]  [address] [address] [phone]    No answer, LVMTCB if would like assistance scheduling

## 2022-09-26 NOTE — PROGRESS NOTES
2nd attempt PCP fup apt request  [facility]  [address] [address] [phone]    Pt declined scheduling, stated she doesn't think she needs a follow up, and if she needs one she can go online to schedule herself  Closing encounter

## 2023-01-08 ENCOUNTER — HOSPITAL ENCOUNTER (OUTPATIENT)
Age: 33
Discharge: HOME | End: 2023-01-08
Payer: COMMERCIAL

## 2023-01-08 VITALS
WEIGHT: 170 LBS | OXYGEN SATURATION: 100 % | HEART RATE: 97 BPM | SYSTOLIC BLOOD PRESSURE: 112 MMHG | RESPIRATION RATE: 20 BRPM | DIASTOLIC BLOOD PRESSURE: 69 MMHG | BODY MASS INDEX: 28 KG/M2 | TEMPERATURE: 98 F

## 2023-01-08 DIAGNOSIS — J06.9 UPPER RESPIRATORY VIRUS: Primary | ICD-10-CM

## 2023-01-08 RX ORDER — DEXAMETHASONE SODIUM PHOSPHATE 10 MG/ML
10 INJECTION, SOLUTION INTRAMUSCULAR; INTRAVENOUS ONCE
Status: COMPLETED | OUTPATIENT
Start: 2023-01-08 | End: 2023-01-08

## 2023-01-08 NOTE — DISCHARGE INSTRUCTIONS
Cool-mist humidifier at night. Push fluids. Tylenol or Motrin as needed for pain or fever. Follow-up as needed with your doctor. Return for any concerns.

## 2023-01-08 NOTE — ED INITIAL ASSESSMENT (HPI)
Pt c/o barky cough and sore throat x 1 week. [facility] (6 days ago) and given amox but not tested for strep. Pt states she is the same.

## 2023-01-08 NOTE — ED QUICK NOTES
Went to give pt meds at 0932 and noted family was gone. This RN called the mom on her cell and left a vm. The pt called back stating she misunderstood and thought the medicine was being called into the pharmacy. Pt states she and her child would be back to the IC to get the meds. Pt returned at 1000 and was administered the medicine.

## 2023-04-05 ENCOUNTER — PATIENT MESSAGE (OUTPATIENT)
Dept: ADMINISTRATIVE | Age: 33
End: 2023-04-05

## 2023-04-05 DIAGNOSIS — R89.9 ABNORMAL LABORATORY TEST: Primary | ICD-10-CM

## 2023-10-03 NOTE — LACTATION NOTE
LACTATION NOTE - MOTHER      Evaluation Type: Inpatient    Problems identified  Problems identified: Knowledge deficit    Maternal history  Maternal history: Anxiety;Depression    Breastfeeding goal  Breastfeeding goal: To maintain breast milk feeding per Destruction After The Procedure: No

## 2023-10-13 ENCOUNTER — APPOINTMENT (OUTPATIENT)
Dept: CT IMAGING | Age: 33
End: 2023-10-13
Attending: NURSE PRACTITIONER
Payer: COMMERCIAL

## 2023-10-13 ENCOUNTER — APPOINTMENT (OUTPATIENT)
Dept: GENERAL RADIOLOGY | Age: 33
End: 2023-10-13
Attending: NURSE PRACTITIONER
Payer: COMMERCIAL

## 2023-10-13 ENCOUNTER — HOSPITAL ENCOUNTER (OUTPATIENT)
Age: 33
Discharge: HOME | End: 2023-10-13
Payer: COMMERCIAL

## 2023-10-13 VITALS
HEART RATE: 78 BPM | TEMPERATURE: 98 F | RESPIRATION RATE: 16 BRPM | OXYGEN SATURATION: 100 % | SYSTOLIC BLOOD PRESSURE: 115 MMHG | DIASTOLIC BLOOD PRESSURE: 76 MMHG

## 2023-10-13 DIAGNOSIS — J45.909 REACTIVE AIRWAY DISEASE WITHOUT COMPLICATION, UNSPECIFIED ASTHMA SEVERITY, UNSPECIFIED WHETHER PERSISTENT: ICD-10-CM

## 2023-10-13 DIAGNOSIS — R05.9 COUGH, UNSPECIFIED TYPE: Primary | ICD-10-CM

## 2023-10-13 DIAGNOSIS — R05.1 ACUTE COUGH: ICD-10-CM

## 2023-10-13 LAB
#MXD IC: 0.4 X10ˆ3/UL (ref 0.1–1)
BUN BLD-MCNC: 16 MG/DL (ref 7–18)
CHLORIDE BLD-SCNC: 103 MMOL/L (ref 98–112)
CO2 BLD-SCNC: 25 MMOL/L (ref 21–32)
CREAT BLD-MCNC: 0.7 MG/DL
DDIMER WHOLE BLOOD: 463 NG/ML DDU (ref ?–400)
EGFRCR SERPLBLD CKD-EPI 2021: 116 ML/MIN/1.73M2 (ref 60–?)
GLUCOSE BLD-MCNC: 103 MG/DL (ref 70–99)
HCT VFR BLD AUTO: 39.6 %
HCT VFR BLD CALC: 43 %
HGB BLD-MCNC: 13.1 G/DL
ISTAT IONIZED CALCIUM FOR CHEM 8: 1.17 MMOL/L (ref 1.12–1.32)
LYMPHOCYTES # BLD AUTO: 2.1 X10ˆ3/UL (ref 1–4)
LYMPHOCYTES NFR BLD AUTO: 28.9 %
MCH RBC QN AUTO: 29.5 PG (ref 26–34)
MCHC RBC AUTO-ENTMCNC: 33.1 G/DL (ref 31–37)
MCV RBC AUTO: 89.2 FL (ref 80–100)
MIXED CELL %: 6.2 %
NEUTROPHILS # BLD AUTO: 4.7 X10ˆ3/UL (ref 1.5–7.7)
NEUTROPHILS NFR BLD AUTO: 64.9 %
PLATELET # BLD AUTO: 496 X10ˆ3/UL (ref 150–450)
POTASSIUM BLD-SCNC: 4.4 MMOL/L (ref 3.6–5.1)
RBC # BLD AUTO: 4.44 X10ˆ6/UL
SODIUM BLD-SCNC: 138 MMOL/L (ref 136–145)
TROPONIN I BLD-MCNC: <0.02 NG/ML
WBC # BLD AUTO: 7.2 X10ˆ3/UL (ref 4–11)

## 2023-10-13 PROCEDURE — 71046 X-RAY EXAM CHEST 2 VIEWS: CPT | Performed by: NURSE PRACTITIONER

## 2023-10-13 PROCEDURE — 71260 CT THORAX DX C+: CPT | Performed by: NURSE PRACTITIONER

## 2023-10-13 PROCEDURE — 80047 BASIC METABLC PNL IONIZED CA: CPT | Performed by: NURSE PRACTITIONER

## 2023-10-13 PROCEDURE — 99214 OFFICE O/P EST MOD 30 MIN: CPT | Performed by: NURSE PRACTITIONER

## 2023-10-13 PROCEDURE — 84484 ASSAY OF TROPONIN QUANT: CPT | Performed by: NURSE PRACTITIONER

## 2023-10-13 PROCEDURE — 85025 COMPLETE CBC W/AUTO DIFF WBC: CPT | Performed by: NURSE PRACTITIONER

## 2023-10-13 RX ORDER — PREDNISONE 20 MG/1
40 TABLET ORAL DAILY
Qty: 14 TABLET | Refills: 0 | Status: SHIPPED | OUTPATIENT
Start: 2023-10-13 | End: 2023-10-20

## 2023-10-13 NOTE — ED INITIAL ASSESSMENT (HPI)
Patient comes in x9 days cough associated with back pain, green phlegm, getting progressively worse.

## 2024-08-07 ENCOUNTER — LAB ENCOUNTER (OUTPATIENT)
Dept: LAB | Age: 34
End: 2024-08-07
Attending: FAMILY MEDICINE
Payer: COMMERCIAL

## 2024-08-07 DIAGNOSIS — Z00.00 ANNUAL PHYSICAL EXAM: ICD-10-CM

## 2024-08-07 DIAGNOSIS — R89.9 ABNORMAL LABORATORY TEST: ICD-10-CM

## 2024-08-07 LAB
ALBUMIN SERPL-MCNC: 5 G/DL (ref 3.2–4.8)
ALBUMIN/GLOB SERPL: 1.5 {RATIO} (ref 1–2)
ALP LIVER SERPL-CCNC: 56 U/L
ALT SERPL-CCNC: 15 U/L
ANION GAP SERPL CALC-SCNC: 5 MMOL/L (ref 0–18)
AST SERPL-CCNC: 29 U/L (ref ?–34)
BASOPHILS # BLD AUTO: 0.03 X10(3) UL (ref 0–0.2)
BASOPHILS NFR BLD AUTO: 0.5 %
BILIRUB SERPL-MCNC: 0.7 MG/DL (ref 0.3–1.2)
BUN BLD-MCNC: 13 MG/DL (ref 9–23)
BUN/CREAT SERPL: 14.4 (ref 10–20)
CALCIUM BLD-MCNC: 10.1 MG/DL (ref 8.7–10.4)
CHLORIDE SERPL-SCNC: 109 MMOL/L (ref 98–112)
CHOLEST SERPL-MCNC: 170 MG/DL (ref ?–200)
CO2 SERPL-SCNC: 24 MMOL/L (ref 21–32)
CREAT BLD-MCNC: 0.9 MG/DL
DEPRECATED RDW RBC AUTO: 41 FL (ref 35.1–46.3)
EGFRCR SERPLBLD CKD-EPI 2021: 85 ML/MIN/1.73M2 (ref 60–?)
EOSINOPHIL # BLD AUTO: 0.06 X10(3) UL (ref 0–0.7)
EOSINOPHIL NFR BLD AUTO: 0.9 %
ERYTHROCYTE [DISTWIDTH] IN BLOOD BY AUTOMATED COUNT: 12.4 % (ref 11–15)
FASTING PATIENT LIPID ANSWER: YES
FASTING STATUS PATIENT QL REPORTED: YES
GLOBULIN PLAS-MCNC: 3.3 G/DL (ref 2–3.5)
GLUCOSE BLD-MCNC: 104 MG/DL (ref 70–99)
HCT VFR BLD AUTO: 40.9 %
HDLC SERPL-MCNC: 52 MG/DL (ref 40–59)
HGB BLD-MCNC: 13.7 G/DL
IMM GRANULOCYTES # BLD AUTO: 0.02 X10(3) UL (ref 0–1)
IMM GRANULOCYTES NFR BLD: 0.3 %
LDLC SERPL CALC-MCNC: 106 MG/DL (ref ?–100)
LYMPHOCYTES # BLD AUTO: 2.03 X10(3) UL (ref 1–4)
LYMPHOCYTES NFR BLD AUTO: 31.3 %
MCH RBC QN AUTO: 30.3 PG (ref 26–34)
MCHC RBC AUTO-ENTMCNC: 33.5 G/DL (ref 31–37)
MCV RBC AUTO: 90.5 FL
MONOCYTES # BLD AUTO: 0.34 X10(3) UL (ref 0.1–1)
MONOCYTES NFR BLD AUTO: 5.2 %
NEUTROPHILS # BLD AUTO: 4.01 X10 (3) UL (ref 1.5–7.7)
NEUTROPHILS # BLD AUTO: 4.01 X10(3) UL (ref 1.5–7.7)
NEUTROPHILS NFR BLD AUTO: 61.8 %
NONHDLC SERPL-MCNC: 118 MG/DL (ref ?–130)
OSMOLALITY SERPL CALC.SUM OF ELEC: 286 MOSM/KG (ref 275–295)
PLATELET # BLD AUTO: 400 10(3)UL (ref 150–450)
POTASSIUM SERPL-SCNC: 4.1 MMOL/L (ref 3.5–5.1)
PROT SERPL-MCNC: 8.3 G/DL (ref 5.7–8.2)
RBC # BLD AUTO: 4.52 X10(6)UL
SODIUM SERPL-SCNC: 138 MMOL/L (ref 136–145)
TRIGL SERPL-MCNC: 63 MG/DL (ref 30–149)
TSI SER-ACNC: 0.44 MIU/ML (ref 0.55–4.78)
VLDLC SERPL CALC-MCNC: 11 MG/DL (ref 0–30)
WBC # BLD AUTO: 6.5 X10(3) UL (ref 4–11)

## 2024-08-07 PROCEDURE — 85025 COMPLETE CBC W/AUTO DIFF WBC: CPT

## 2024-08-07 PROCEDURE — 80053 COMPREHEN METABOLIC PANEL: CPT

## 2024-08-07 PROCEDURE — 84439 ASSAY OF FREE THYROXINE: CPT

## 2024-08-07 PROCEDURE — 80061 LIPID PANEL: CPT

## 2024-08-07 PROCEDURE — 84443 ASSAY THYROID STIM HORMONE: CPT

## 2024-08-07 PROCEDURE — 36415 COLL VENOUS BLD VENIPUNCTURE: CPT

## 2024-08-08 LAB — T4 FREE SERPL-MCNC: 1.3 NG/DL (ref 0.8–1.7)

## 2024-08-09 ENCOUNTER — TELEPHONE (OUTPATIENT)
Age: 34
End: 2024-08-09

## 2024-08-09 NOTE — TELEPHONE ENCOUNTER
Hello,    Sorry I missed you - I am reaching out from the [facility] Navigation department, following up on an order from your provider's office to assist in connecting you with resources for care. If you would like to discuss this further, please give us a call back at [phone], or for more immediate assistance you can contact our 24-hour help line at [phone]. We look forward to hearing from you soon.

## 2024-08-09 NOTE — TELEPHONE ENCOUNTER
Ashley,   Thank you for speaking with me today. Below are the behavioral health providers I think might be a good fit and that are showing in network with your insurance. Please call the provider directly to schedule an appointment. If distance is a concern please inquire on virtual service options.     [facility]   Dr. Mecca Johnson   [address],  [place] [address]  Phone:[phone]  [email or web address]    [facility]  Rolo Montanez  [address], [address]  [place], [address]  Phone: [phone]  [email or web address]     [facility]   Paulina Brown  [address]  [place] [address]  Phone: [phone]  [email or web address]     Amanda Lopez  [address]  [address]  [place] [address]  Phone: [phone]  [email or web address]     [facility] (Therapy resources, male therapist)    Gordy Gómez   [address] ,   [place], [place], [address]  Phone: [phone]  [email or web address]     [facility] -Multiple [place] Locations   [address],   [place] [address]  Phone: [phone]  [email or web address]    [facility]   Liset Travis   [address],   [place], [place], [address]  Phone: [phone]  [email or web address]    Warm regards,   If any safety concerns arise it is advised to call 911, go to the nearest emergency room. [facility] crisis walk-in facility is located at [address]  in [place]. Contact Number for [facility] is [phone].    Jody Tolliver LCPC  (she/her/hers)  Lead Patient Care Navigator Mental Health   [facility]/Mental Health Division    [email or web address]  [phone]  work

## 2024-11-05 ENCOUNTER — HOSPITAL ENCOUNTER (OUTPATIENT)
Age: 34
Discharge: HOME | End: 2024-11-05
Payer: COMMERCIAL

## 2024-11-05 VITALS
SYSTOLIC BLOOD PRESSURE: 120 MMHG | RESPIRATION RATE: 16 BRPM | OXYGEN SATURATION: 100 % | TEMPERATURE: 98 F | DIASTOLIC BLOOD PRESSURE: 84 MMHG | HEART RATE: 91 BPM

## 2024-11-05 DIAGNOSIS — R30.0 DYSURIA: Primary | ICD-10-CM

## 2024-11-05 LAB
B-HCG UR QL: NEGATIVE
BILIRUB UR QL STRIP: NEGATIVE
CLARITY UR: CLEAR
GLUCOSE UR STRIP-MCNC: 100 MG/DL
HGB UR QL STRIP: NEGATIVE
LEUKOCYTE ESTERASE UR QL STRIP: NEGATIVE
NITRITE UR QL STRIP: POSITIVE
PH UR STRIP: 5.5 [PH]
SP GR UR STRIP: 1.01
UROBILINOGEN UR STRIP-ACNC: <2 MG/DL

## 2024-11-05 PROCEDURE — 81002 URINALYSIS NONAUTO W/O SCOPE: CPT | Performed by: PHYSICIAN ASSISTANT

## 2024-11-05 PROCEDURE — 99214 OFFICE O/P EST MOD 30 MIN: CPT | Performed by: PHYSICIAN ASSISTANT

## 2024-11-05 PROCEDURE — 81025 URINE PREGNANCY TEST: CPT | Performed by: PHYSICIAN ASSISTANT

## 2024-11-05 RX ORDER — CEFADROXIL 500 MG/1
500 CAPSULE ORAL 2 TIMES DAILY
Qty: 20 CAPSULE | Refills: 0 | Status: SHIPPED | OUTPATIENT
Start: 2024-11-05 | End: 2024-11-15

## 2024-11-05 NOTE — ED INITIAL ASSESSMENT (HPI)
1 week of lower abd pain that now is in LLQ and constant pain. Last normal bm was yesterday. Lower back pain. Reports urinary urgency and cloudy urine. Too azo yesterday.  + chills intermittently.

## 2024-11-05 NOTE — ED PROVIDER NOTES
Patient Seen in: [facility]      History     Chief Complaint   Patient presents with    Urinary     Stated Complaint: urinary symptoms    Subjective:   HPI      34-year-old female presenting with concern for urinary tract infection.  Patient states she has been experiencing lower abdominal discomfort for the last 1 week.  She has some urinary urgency and her urine appears cloudy.  She states intermittent chills, low back pain.  She has had cystitis before and notes presented similarly.  Patient denies vaginal discharge.  She sexually active with 1 partner () and has no concerns for STI at this time.    Objective:     Past Medical History:    ADHD (attention deficit hyperactivity disorder)    Anemia    Anxiety    lexapro x 1 yr    Asthma (HCC)    as a child    Decorative tattoo    Depression    History of febrile seizure    HSV-1 infection    Mastitis, right, acute              Past Surgical History:   Procedure Laterality Date    D & c  2013    termination          Repair/graft achilles tendon Right 1994    Camilla teeth removed  2009                Social History     Socioeconomic History    Marital status:    Tobacco Use    Smoking status: Former     Current packs/day: 0.00     Types: Cigarettes     Quit date: 10/5/2021     Years since quitting: 3.0     Passive exposure: Current    Smokeless tobacco: Never   Vaping Use    Vaping status: Never Used   Substance and Sexual Activity    Alcohol use: Not Currently     Alcohol/week: 0.0 standard drinks of alcohol     Comment: OCC    Drug use: No    Sexual activity: Not Currently     Partners: Male     Comment: same partner              Review of Systems    Positive for stated complaint: urinary symptoms  Other systems are as noted in HPI.  Constitutional and vital signs reviewed.      All other systems reviewed and negative except as noted above.    Physical Exam     ED Triage Vitals [24 0854]   /84   Pulse 91   Resp 16   Temp  97.6 °F (36.4 °C)   Temp src Temporal   SpO2 100 %   O2 Device None (Room air)       Current Vitals:   Vital Signs  BP: 120/84  Pulse: 91  Resp: 16  Temp: 97.6 °F (36.4 °C)  Temp src: Temporal    Oxygen Therapy  SpO2: 100 %  O2 Device: None (Room air)        Physical Exam  Vitals and nursing note reviewed.   Constitutional:       General: She is not in acute distress.  HENT:      Head: Normocephalic and atraumatic.      Right Ear: External ear normal.      Left Ear: External ear normal.      Nose: Nose normal.      Mouth/Throat:      Mouth: Mucous membranes are moist.   Eyes:      Extraocular Movements: Extraocular movements intact.      Pupils: Pupils are equal, round, and reactive to light.   Cardiovascular:      Rate and Rhythm: Normal rate.   Pulmonary:      Effort: Pulmonary effort is normal.   Abdominal:      General: Abdomen is flat. There is no distension.      Tenderness: There is no abdominal tenderness.   Musculoskeletal:         General: Normal range of motion.      Cervical back: Normal range of motion.   Skin:     General: Skin is warm.   Neurological:      General: No focal deficit present.      Mental Status: She is alert and oriented to person, place, and time.   Psychiatric:         Mood and Affect: Mood normal.         Behavior: Behavior normal.             ED Course     Labs Reviewed   [facility] POCT URINALYSIS DIPSTICK - Abnormal; Notable for the following components:       Result Value    Urine Color Orange (*)     Protein urine Trace (*)     Glucose, Urine 100 (*)     Ketone, Urine Trace (*)     Nitrite Urine Positive (*)     All other components within normal limits   POCT PREGNANCY URINE - Normal   URINE CULTURE, ROUTINE          34-year-old female presenting with concern for urinary tract infection.  She did take Azo so the urine dip is not entirely reliable.  A dedicated culture is pending  Is nitrite positive with some trace ketone.  Urine pregnancy is negative  I discussed if the urine culture  is negative for bacterial growth then she should follow-up with her primary care/gynecology to evaluate for further etiologies of the symptoms.  There is no abdominal distention or tenderness    Ddx- cystitis, uti, pyelonephritis, diverticulitis           MDM              Medical Decision Making      Disposition and Plan     Clinical Impression:  1. Dysuria         Disposition:  Discharge  11/5/2024  9:39 am    Follow-up:  Digna Young MD  [address] [place] [id number] [phone]                Medications Prescribed:  Discharge Medication List as of 11/5/2024  9:45 AM        START taking these medications    Details   cefadroxil 500 MG Oral Cap Take 1 capsule (500 mg total) by mouth 2 (two) times daily for 10 days., Normal, Disp-20 capsule, R-0                 Supplementary Documentation:

## 2024-11-05 NOTE — DISCHARGE INSTRUCTIONS
He should take Tylenol or ibuprofen for the discomfort.      If the urine culture results with no bacterial growth, you should be evaluated by your primary care provider to evaluate for other etiologies of your symptoms

## 2024-11-07 NOTE — TELEPHONE ENCOUNTER
Please review. Protocol Pass    Original rx written 90 with no refills.  Rx is pended, is refill appropriate?     Requested Prescriptions   Pending Prescriptions Disp Refills    SERTRALINE 50 MG Oral Tab [Pharmacy Med Name: SERTRALINE HCL 50 MG TABLET] 90 tablet 0     Sig: TAKE 1/2 BY MOUTH EVERY DAY FOR 7 DAYS THEN 1 BY MOUTH EVERY DAY       Psychiatric Non-Scheduled (Anti-Anxiety) Passed - 11/7/2024 12:09 PM        Passed - In person appointment or virtual visit in the past 6 mos or appointment in next 3 mos     Recent Outpatient Visits              3 months ago Other depression    [facility] Digna Jessica MD    Office Visit    2 years ago Encounter for insertion of intrauterine contraceptive device (IUD)    [facility] - OB/Jennifer Justin MD    Office Visit    2 years ago Postpartum care and examination (HCC)    [facility] - OB/GYN Jennifer Fulton MD    Postpartum    2 years ago Mastitis, left, acute    [facility] - OB/Armen Casas DO    Office Visit    2 years ago Mastitis, right, acute    [facility] [address] [place] - OB/Armen Casas,     Office Visit          Future Appointments         Provider Department Appt Notes    In 2 months [facility]                    Passed - Depression Screening completed within the past 12 months               Future Appointments         Provider Department Appt Notes    In 2 months [facility] T4 labs and physical          Recent Outpatient Visits              3 months ago Other depression    [facility] Digna Jessica MD    Office Visit    2 years ago  Encounter for insertion of intrauterine contraceptive device (IUD)    [facility] - [facility] Jennifer Fulton MD    Office Visit    2 years ago Postpartum care and examination (HCC)    [facility] [place] [facility] - OB/GYN Jennifer Fulton MD    Postpartum    2 years ago Mastitis, left, acute    [facility] - [facility]/Armen Casas,     Office Visit    2 years ago Mastitis, right, acute    [facility] [address], [place] - OB/Armen Casas,     Office Visit

## 2024-11-07 NOTE — TELEPHONE ENCOUNTER
To pharmacy: Needs to make an apt for further refills     Future Appointments   Date Time Provider Department Center   2/4/2025 11:50 AM [facility]

## 2025-04-05 ENCOUNTER — HOSPITAL ENCOUNTER (OUTPATIENT)
Age: 35
Discharge: HOME | End: 2025-04-05
Payer: COMMERCIAL

## 2025-04-05 VITALS
OXYGEN SATURATION: 100 % | DIASTOLIC BLOOD PRESSURE: 76 MMHG | HEART RATE: 88 BPM | SYSTOLIC BLOOD PRESSURE: 132 MMHG | RESPIRATION RATE: 16 BRPM | TEMPERATURE: 98 F

## 2025-04-05 DIAGNOSIS — J02.9 SORE THROAT: ICD-10-CM

## 2025-04-05 DIAGNOSIS — J02.9 VIRAL PHARYNGITIS: Primary | ICD-10-CM

## 2025-04-05 LAB
POCT INFLUENZA A: NEGATIVE
POCT INFLUENZA B: NEGATIVE
S PYO AG THROAT QL: NEGATIVE

## 2025-04-05 PROCEDURE — 87880 STREP A ASSAY W/OPTIC: CPT

## 2025-04-05 PROCEDURE — 99213 OFFICE O/P EST LOW 20 MIN: CPT

## 2025-04-05 PROCEDURE — 87502 INFLUENZA DNA AMP PROBE: CPT

## 2025-04-05 NOTE — DISCHARGE INSTRUCTIONS
Strep test is negative.  Influemza test is negative.   There are no signs of infection on physical exam.  This is likely a viral illness.  Please be sure to drink plenty of fluids, use Tylenol and Motrin for pain or fever.  Use Flonase and Mucinex for congestion.  If you develop any respiratory complaints, fever that does not improve with medications or any other concerning complaints you should go to the emergency department. Otherwise follow up with your primary care provider.

## 2025-04-05 NOTE — ED PROVIDER NOTES
Patient Seen in: [facility]      History     Chief Complaint   Patient presents with    Sore Throat     Stated Complaint: Sore Throat  Subjective:   Jocy is a 35-year-old female presenting to the immediate care complaining of bodyaches, mild rhinorrhea, chills and sore throat.  Patient states that she has pain when she swallows.  No difficulty swallowing or voice changes.  Patient states that she has not had a measured fever.  She denies any chest pain, shortness of breath, abdominal pain or vomiting.  She does not feel like she has had much congestion or cough.  She is eating and drinking well and is well-hydrated.  She denies any other concerns or complaints.        Objective:   Past Medical History:    ADHD (attention deficit hyperactivity disorder)    Anemia    Anxiety    lexapro x 1 yr    Asthma (HCC)    as a child    Decorative tattoo    Depression    History of febrile seizure    HSV-1 infection    Mastitis, right, acute            Past Surgical History:   Procedure Laterality Date    D & c  2013    termination          Repair/graft achilles tendon Right     Abie teeth removed                Social History     Socioeconomic History    Marital status:    Tobacco Use    Smoking status: Former     Current packs/day: 0.00     Types: Cigarettes     Quit date: 10/5/2021     Years since quitting: 3.5     Passive exposure: Current    Smokeless tobacco: Never   Vaping Use    Vaping status: Never Used   Substance and Sexual Activity    Alcohol use: Yes     Comment: OCC    Drug use: No    Sexual activity: Not Currently     Partners: Male     Comment: same partner            Review of Systems    Positive for stated complaint: Sore Throat    Other systems are as noted in HPI.  Constitutional and vital signs reviewed.      All other systems reviewed and negative except as noted above.    Physical Exam     ED Triage Vitals [25 1353]   /76   Pulse 88   Resp 16   Temp 98.3  °F (36.8 °C)   Temp src Oral   SpO2 100 %   O2 Device None (Room air)     Current:/76   Pulse 88   Temp 98.3 °F (36.8 °C) (Oral)   Resp 16   LMP 08/01/2024 (Approximate)   SpO2 100%     Physical Exam  Vitals and nursing note reviewed.   Constitutional:       General: She is not in acute distress.     Appearance: Normal appearance. She is not ill-appearing, toxic-appearing or diaphoretic.   HENT:      Head: Normocephalic.      Right Ear: Tympanic membrane, ear canal and external ear normal.      Left Ear: Tympanic membrane, ear canal and external ear normal.      Nose: Nose normal.      Mouth/Throat:      Mouth: Mucous membranes are moist. No oral lesions.      Pharynx: Oropharynx is clear. Uvula midline. No pharyngeal swelling, oropharyngeal exudate, posterior oropharyngeal erythema, uvula swelling or postnasal drip.      Tonsils: No tonsillar exudate or tonsillar abscesses. 0 on the right. 0 on the left.      Comments: No trismus  Eyes:      Conjunctiva/sclera: Conjunctivae normal.   Cardiovascular:      Rate and Rhythm: Normal rate and regular rhythm.      Pulses: Normal pulses.      Heart sounds: Normal heart sounds.   Pulmonary:      Effort: Pulmonary effort is normal. No tachypnea, bradypnea, accessory muscle usage, prolonged expiration, respiratory distress or retractions.      Breath sounds: Normal breath sounds and air entry. No stridor, decreased air movement or transmitted upper airway sounds. No decreased breath sounds, wheezing, rhonchi or rales.   Abdominal:      General: Abdomen is flat.   Musculoskeletal:         General: Normal range of motion.      Cervical back: Normal range of motion.   Skin:     General: Skin is warm.      Capillary Refill: Capillary refill takes less than 2 seconds.   Neurological:      General: No focal deficit present.      Mental Status: She is alert and oriented to person, place, and time.   Psychiatric:         Mood and Affect: Mood normal.         Behavior:  Behavior normal.         Thought Content: Thought content normal.         Judgment: Judgment normal.         ED Course   Radiology:    No orders to display     Labs Reviewed   POCT RAPID STREP - Normal   POCT FLU TEST - Normal    Narrative:     This assay is a rapid molecular in vitro test utilizing nucleic acid amplification of influenza A and B viral RNA.       MDM     Medical Decision Making  Differential diagnoses reflecting the complexity of care include but are not limited to viral versus bacterial etiology of upper respiratory complaints.    Comorbidities that add complexity to management include: None  History obtained by an independent source was from: Patient  Patient is well appearing, non-toxic and in no acute distress.  Vital signs are stable.     Strep test is negative.  Influenza test is negative.  There are no signs of infection on physical exam.  History and physical exam are consistent with viral illness.  Recommended that patient drink plenty of fluids, use Tylenol and Motrin for pain or fever, use Flonase for nasal congestion and may use over-the-counter medications for congestion as needed.  Recommended that if the patient develops any chest pain, respiratory complaints, fever that does not improve with medications or any other concerning complaints they should go to the emergency department.  ED precautions discussed.  Patient (guardian) advised to follow up with PCP in 2-3 days.  Patient (guardian) agrees with this plan of care.  Patient (guardian) verbalizes understanding of discharge instructions and plan of care.      Amount and/or Complexity of Data Reviewed  Labs: ordered. Decision-making details documented in ED Course.    Risk  OTC drugs.        Disposition and Plan     Clinical Impression:  1. Viral pharyngitis    2. Sore throat         Disposition:  Discharge  4/5/2025  2:40 pm    Follow-up:  Digna Young MD  [address] [place] [address]  [id number]  [phone]                Medications Prescribed:  Discharge Medication List as of 4/5/2025  2:43 PM

## 2025-04-22 ENCOUNTER — HOSPITAL ENCOUNTER (OUTPATIENT)
Age: 35
Discharge: HOME | End: 2025-04-22
Payer: COMMERCIAL

## 2025-04-22 VITALS
TEMPERATURE: 98 F | RESPIRATION RATE: 12 BRPM | DIASTOLIC BLOOD PRESSURE: 75 MMHG | HEART RATE: 88 BPM | SYSTOLIC BLOOD PRESSURE: 107 MMHG | OXYGEN SATURATION: 100 %

## 2025-04-22 DIAGNOSIS — J10.1 INFLUENZA A: ICD-10-CM

## 2025-04-22 DIAGNOSIS — R50.9 FEVER: ICD-10-CM

## 2025-04-22 DIAGNOSIS — J01.00 ACUTE MAXILLARY SINUSITIS, RECURRENCE NOT SPECIFIED: Primary | ICD-10-CM

## 2025-04-22 LAB
POCT INFLUENZA A: POSITIVE
POCT INFLUENZA B: NEGATIVE

## 2025-04-22 PROCEDURE — 99213 OFFICE O/P EST LOW 20 MIN: CPT | Performed by: PHYSICIAN ASSISTANT

## 2025-04-22 PROCEDURE — 87502 INFLUENZA DNA AMP PROBE: CPT | Performed by: PHYSICIAN ASSISTANT

## 2025-04-22 RX ORDER — PREDNISONE 20 MG/1
40 TABLET ORAL DAILY
Qty: 6 TABLET | Refills: 0 | Status: SHIPPED | OUTPATIENT
Start: 2025-04-22 | End: 2025-04-25

## 2025-04-22 RX ORDER — DEXAMETHASONE 4 MG/1
4 TABLET ORAL ONCE
Status: COMPLETED | OUTPATIENT
Start: 2025-04-22 | End: 2025-04-22

## 2025-04-22 RX ORDER — AMOXICILLIN 875 MG/1
875 TABLET, COATED ORAL 2 TIMES DAILY
Qty: 20 TABLET | Refills: 0 | Status: SHIPPED | OUTPATIENT
Start: 2025-04-22 | End: 2025-05-02

## 2025-04-22 NOTE — ED PROVIDER NOTES
Patient Seen in: [facility]      History     Chief Complaint   Patient presents with    Fever    Sinus Problem     Stated Complaint: sinus problem    Subjective:   HPI    35-year-old female with a history of asthma here with complaint of sinus pain and pressure with purulent drainage and fever and wheezing that started on friday.  Patient denies chest pain, abdominal pain, nausea, vomiting or diarrhea.  Patient is tolerating p.o. speaking full sentences.  Afebrile.               Objective:     Past Medical History:    ADHD (attention deficit hyperactivity disorder)    Anemia    Anxiety    lexapro x 1 yr    Asthma (HCC)    as a child    Decorative tattoo    Depression    History of febrile seizure    HSV-1 infection    Mastitis, right, acute            The patient's medication list, past medical history and social history elements  as listed in today's nurse's notes are reviewed and agree.   The patient's family history is reviewed and is noncontributory to the presenting problem, except as indicated as above.     Past Surgical History:   Procedure Laterality Date    D & c  2013    termination          Repair/graft achilles tendon Right     [place] teeth removed                  Social History     Socioeconomic History    Marital status:    Tobacco Use    Smoking status: Former     Current packs/day: 0.00     Types: Cigarettes     Quit date: 10/5/2021     Years since quitting: 3.5     Passive exposure: Current    Smokeless tobacco: Never   Vaping Use    Vaping status: Never Used   Substance and Sexual Activity    Alcohol use: Yes     Comment: OCC    Drug use: No    Sexual activity: Not Currently     Partners: Male     Comment: same partner              Review of Systems    Positive for stated complaint: sinus problem  Other systems are as noted in HPI.  Constitutional and vital signs reviewed.      All other systems reviewed and negative except as noted  above.                  Physical Exam     ED Triage Vitals [04/22/25 0857]   /75   Pulse 88   Resp 12   Temp 98 °F (36.7 °C)   Temp src Oral   SpO2 100 %   O2 Device None (Room air)       Current Vitals:   Vital Signs  BP: 107/75  Pulse: 88  Resp: 12  Temp: 98 °F (36.7 °C)  Temp src: Oral    Oxygen Therapy  SpO2: 100 %  O2 Device: None (Room air)        Physical Exam  Vitals and nursing note reviewed.   Constitutional:       Appearance: Normal appearance. She is well-developed.   HENT:      Head: Normocephalic.      Jaw: There is normal jaw occlusion.      Right Ear: External ear normal. Tympanic membrane is bulging.      Left Ear: External ear normal. Tympanic membrane is bulging.      Nose: Mucosal edema, congestion and rhinorrhea present. Rhinorrhea is clear.      Right Sinus: Maxillary sinus tenderness present.      Left Sinus: Maxillary sinus tenderness present.      Mouth/Throat:      Lips: Pink.      Mouth: Mucous membranes are moist.      Pharynx: Postnasal drip present.   Eyes:      Conjunctiva/sclera: Conjunctivae normal.      Pupils: Pupils are equal, round, and reactive to light.   Cardiovascular:      Rate and Rhythm: Normal rate and regular rhythm.      Heart sounds: Normal heart sounds.   Pulmonary:      Effort: Pulmonary effort is normal.      Breath sounds: Wheezing present.   Musculoskeletal:      Cervical back: Normal range of motion and neck supple.   Skin:     General: Skin is warm.   Neurological:      Mental Status: She is alert and oriented to person, place, and time.   Psychiatric:         Behavior: Behavior normal.         Thought Content: Thought content normal.         Judgment: Judgment normal.                       ED Course     Labs Reviewed   POCT FLU TEST - Abnormal; Notable for the following components:       Result Value    POCT INFLUENZA A Positive (*)     All other components within normal limits    Narrative:     This assay is a rapid molecular in vitro test utilizing  nucleic acid amplification of influenza A and B viral RNA.                                         [facility]       Clinical Impression: sinusitis maxillary/Flu A/PND/fever  Course of Treatment:   The Decadron work in your system the next several days.  You may start the additional prednisone on day 2 or 3 if symptoms persist.  Use your inhaler every 4-6 hours as needed.  Take Motrin and/or Tylenol for fever and pain.  Recommend taking an over the counter antihistamine daily: IE zyrtec/claritin.  Sleep more upright. Use chloraseptic spray to help stop the cough trigger reflex.  Push fluids and gargle with warm saline rinses.  If symptoms persist or worsen i.e. increasing fevers shortness of breath etc. recommend the ER.  Otherwise close follow-up with your PCP for further evaluation and treatment.    The patient is encouraged to return if any concerning symptoms arise. Additional verbal discharge instructions are given and discussed. Discharge medications are discussed. The patient is in good condition throughout the visit today and remains so upon discharge. I discuss the plan of care with the patient, who expresses understanding. All questions and concerns are addressed to the patient's satisfaction prior to discharge today.  Previous conversations with PCP and charts were reviewed.            Disposition and Plan     Clinical Impression:  1. Acute maxillary sinusitis, recurrence not specified    2. Fever    3. Influenza A         Disposition:  Discharge  4/22/2025  9:23 am    Follow-up:  Digna Young MD  [address] [place] [address] [id number] [phone]                Medications Prescribed:  Current Discharge Medication List        START taking these medications    Details   predniSONE 20 MG Oral Tab Take 2 tablets (40 mg total) by mouth daily for 3 days. Start on day 2-3 if symptoms persist  Qty: 6 tablet, Refills: 0      amoxicillin 875 MG Oral Tab Take 1 tablet (875 mg total) by mouth 2 (two) times daily for 10  days.  Qty: 20 tablet, Refills: 0             Supplementary Documentation:

## 2025-04-22 NOTE — ED INITIAL ASSESSMENT (HPI)
Pt c/o sore throat, post nasal drip, cough, facial pressure, SOB x4 days, fever x3 days.  Tmax 102.  Last dose tylenol at 2am today.

## 2025-04-22 NOTE — DISCHARGE INSTRUCTIONS
Please return to the ER/clinic if symptoms worsen. Follow-up with your PCP in 24-48 hours as needed.    The Decadron work in your system the next several days.  You may start the additional prednisone on day 2 or 3 if symptoms persist.  Use your inhaler every 4-6 hours as needed.  Take Motrin and/or Tylenol for fever and pain.  Recommend taking an over the counter antihistamine daily: IE zyrtec/claritin.  Sleep more upright. Use chloraseptic spray to help stop the cough trigger reflex.  Push fluids and gargle with warm saline rinses.  If symptoms persist or worsen i.e. increasing fevers shortness of breath etc. recommend the ER.  Otherwise close follow-up with your PCP for further evaluation and treatment.

## (undated) DIAGNOSIS — U07.1 COVID-19 AFFECTING PREGNANCY, ANTEPARTUM: Primary | ICD-10-CM

## (undated) DIAGNOSIS — O98.519 COVID-19 AFFECTING PREGNANCY, ANTEPARTUM: Primary | ICD-10-CM

## (undated) NOTE — LETTER
VACCINE ADMINISTRATION RECORD  PARENT / GUARDIAN APPROVAL  Date: 2022  Vaccine administered to: Garry Preston     : 3/9/1990    MRN: [id number]    A copy of the appropriate Centers for Disease Control and Prevention Vaccine Information statement has been provided. I have read or have had explained the information about the diseases and the vaccines listed below. There was an opportunity to ask questions and any questions were answered satisfactorily. I believe that I understand the benefits and risks of the vaccine cited and ask that the vaccine(s) listed below be given to me or to the person named above (for whom I am authorized to make this request). VACCINES ADMINISTERED:  Tdap    I have read and hereby agree to be bound by the terms of this agreement as stated above. My signature is valid until revoked by me in writing. This document is signed by SELF, relationship: Self on 2022.:                                                                                                                                         Parent / Guardian Signature                                                Date    Benson Gilliam served as a witness to authentication that the identity of the person signing electronically is in fact the person represented as signing. This document was generated by Benson Gilliam on 2022.

## (undated) NOTE — LETTER
AUTHORIZATION FOR SURGICAL OPERATION OR OTHER PROCEDURE    1. I hereby authorize JOSE ANTONIO ADORNO, and [facility] staff assigned to my case to perform the following operation and/or procedure at the [facility]:    IUD REMOVAL        2.   My phys Responsible person                          []  Spouse  In case of minor or                    [] Other  _____________   Incompetent name:  __________________________________________________                               (please print)      _____________

## (undated) NOTE — LETTER
AUTHORIZATION FOR SURGICAL OPERATION OR OTHER PROCEDURE    1. I hereby authorize Dr. Mario Finney, and [facility] staff assigned to my case to perform the following operation and/or procedure at the [facility] [facility]:      IUD INSERTION       2. My physician has explained the nature and purpose of the operation or other procedure, possible alternative methods of treatment, the risks involved, and the possibility of complication to me. I acknowledge that no guarantee has been made as to the result that may be obtained. 3.  I recognize that, during the course of this operation, or other procedure, unforseen conditions may necessitate additional or different procedure than those listed above. I, therefore, further authorize and request that the above named physician, his/her physician assistants or designees perform such procedures as are, in his/her professional opinion, necessary and desirable. 4.  Any tissue or organs removed in the operation or other procedure may be disposed of by and at the discretion of the [facility] and [facility]. 5.  I understand that in the event of a medical emergency, I will be transported by local paramedics to [facility] or other hospital emergency department. 6.  I certify that I have read and fully understand the above consent to operation and/or other procedure. 7.  I acknowledge that my physician has explained sedation/analgesia administration to me including the risks and benefits. I consent to the administration of sedation/analgesia as may be necessary or desirable in the judgement of my physician. Witness signature: ___________________________________________________ Date:  ______/______/_____                    Time:  ________ A. M.  P.M.        Patient Name:  ______________________________________________________  (please print)      Patient signature: ___________________________________________________             Relationship to Patient:           []  Parent    Responsible person                          []  Spouse  In case of minor or                    [] Other  _____________   Incompetent name:  __________________________________________________                               (please print)      _____________      Responsible person  In case of minor or  Incompetent signature:  _______________________________________________    Statement of Physician  My signature below affirms that prior to the time of the procedure, I have explained to the patient and/or his/her guardian, the risks and benefits involved in the proposed treatment and any reasonable alternative to the proposed treatment. I have also explained the risks and benefits involved in the refusal of the proposed treatment and have answered the patient's questions.                         Date:  ______/______/_______  Provider                      Signature:  __________________________________________________________       Time:  ___________ A.M    P.M.

## (undated) NOTE — LETTER
VACCINE ADMINISTRATION RECORD  PARENT / GUARDIAN APPROVAL  Date: 10/25/2019  Vaccine administered to: José Miguel Blair     : 3/9/1990    MRN: [id number]    A copy of the appropriate Centers for Disease Control and Prevention Vaccine Information statement

## (undated) NOTE — Clinical Note
2/10/2017              Alon Aguirre        [address] [facility] [address]         Dear Chris Ramires,      It was a pleasure to see you at our [address] [facility] office.   Your pap was normal.  There is no n